# Patient Record
Sex: FEMALE | Race: WHITE | Employment: OTHER | URBAN - NONMETROPOLITAN AREA
[De-identification: names, ages, dates, MRNs, and addresses within clinical notes are randomized per-mention and may not be internally consistent; named-entity substitution may affect disease eponyms.]

---

## 2017-06-07 ENCOUNTER — HOSPITAL ENCOUNTER (OUTPATIENT)
Dept: GENERAL RADIOLOGY | Age: 77
Discharge: HOME | End: 2017-06-07
Payer: MEDICARE

## 2017-06-07 DIAGNOSIS — M54.16 LUMBAR RADICULOPATHY: ICD-10-CM

## 2017-06-07 PROCEDURE — 72100 X-RAY EXAM L-S SPINE 2/3 VWS: CPT

## 2020-08-14 ENCOUNTER — APPOINTMENT (OUTPATIENT)
Dept: CT IMAGING | Facility: HOSPITAL | Age: 80
End: 2020-08-14

## 2020-08-14 ENCOUNTER — APPOINTMENT (OUTPATIENT)
Dept: GENERAL RADIOLOGY | Facility: HOSPITAL | Age: 80
End: 2020-08-14

## 2020-08-14 ENCOUNTER — HOSPITAL ENCOUNTER (INPATIENT)
Facility: HOSPITAL | Age: 80
LOS: 3 days | Discharge: NURSING, REHAB OR LONG-TERM CARE | End: 2020-08-17
Attending: EMERGENCY MEDICINE | Admitting: INTERNAL MEDICINE

## 2020-08-14 DIAGNOSIS — R93.0 ABNORMAL CT OF THE HEAD: ICD-10-CM

## 2020-08-14 DIAGNOSIS — R41.89 IMPAIRED COGNITION: ICD-10-CM

## 2020-08-14 DIAGNOSIS — D64.9 ANEMIA, UNSPECIFIED TYPE: ICD-10-CM

## 2020-08-14 DIAGNOSIS — Z78.9 DECREASED ACTIVITIES OF DAILY LIVING (ADL): ICD-10-CM

## 2020-08-14 DIAGNOSIS — R27.0 ATAXIA: Primary | ICD-10-CM

## 2020-08-14 DIAGNOSIS — Z74.09 IMPAIRED MOBILITY: ICD-10-CM

## 2020-08-14 DIAGNOSIS — G89.4 CHRONIC PAIN SYNDROME: ICD-10-CM

## 2020-08-14 PROBLEM — G89.11 ACUTE PAIN DUE TO TRAUMA: Status: ACTIVE | Noted: 2020-08-14

## 2020-08-14 PROBLEM — R29.6 FALLS FREQUENTLY: Status: ACTIVE | Noted: 2020-08-14

## 2020-08-14 PROBLEM — R93.89 ABNORMAL CT SCAN: Status: ACTIVE | Noted: 2020-08-14

## 2020-08-14 PROBLEM — M19.91 PRIMARY OSTEOARTHRITIS: Status: ACTIVE | Noted: 2020-08-14

## 2020-08-14 PROBLEM — I10 ESSENTIAL HYPERTENSION: Status: ACTIVE | Noted: 2020-08-14

## 2020-08-14 LAB
ALBUMIN SERPL-MCNC: 3.9 G/DL (ref 3.5–5.2)
ALBUMIN/GLOB SERPL: 1.6 G/DL
ALP SERPL-CCNC: 77 U/L (ref 39–117)
ALT SERPL W P-5'-P-CCNC: 9 U/L (ref 1–33)
ANION GAP SERPL CALCULATED.3IONS-SCNC: 13 MMOL/L (ref 5–15)
AST SERPL-CCNC: 12 U/L (ref 1–32)
BACTERIA UR QL AUTO: ABNORMAL /HPF
BASOPHILS # BLD AUTO: 0.04 10*3/MM3 (ref 0–0.2)
BASOPHILS NFR BLD AUTO: 0.4 % (ref 0–1.5)
BILIRUB SERPL-MCNC: 0.2 MG/DL (ref 0–1.2)
BILIRUB UR QL STRIP: NEGATIVE
BUN SERPL-MCNC: 23 MG/DL (ref 8–23)
BUN/CREAT SERPL: 35.4 (ref 7–25)
CALCIUM SPEC-SCNC: 9.3 MG/DL (ref 8.6–10.5)
CHLORIDE SERPL-SCNC: 98 MMOL/L (ref 98–107)
CLARITY UR: CLEAR
CO2 SERPL-SCNC: 29 MMOL/L (ref 22–29)
COLOR UR: YELLOW
CREAT SERPL-MCNC: 0.65 MG/DL (ref 0.57–1)
DEPRECATED RDW RBC AUTO: 49.3 FL (ref 37–54)
EOSINOPHIL # BLD AUTO: 0.17 10*3/MM3 (ref 0–0.4)
EOSINOPHIL NFR BLD AUTO: 1.9 % (ref 0.3–6.2)
ERYTHROCYTE [DISTWIDTH] IN BLOOD BY AUTOMATED COUNT: 15.7 % (ref 12.3–15.4)
FERRITIN SERPL-MCNC: 746.8 NG/ML (ref 13–150)
GFR SERPL CREATININE-BSD FRML MDRD: 106 ML/MIN/1.73
GFR SERPL CREATININE-BSD FRML MDRD: 88 ML/MIN/1.73
GLOBULIN UR ELPH-MCNC: 2.4 GM/DL
GLUCOSE SERPL-MCNC: 135 MG/DL (ref 65–99)
GLUCOSE UR STRIP-MCNC: NEGATIVE MG/DL
HCT VFR BLD AUTO: 28.2 % (ref 34–46.6)
HGB BLD-MCNC: 8.8 G/DL (ref 12–15.9)
HGB UR QL STRIP.AUTO: ABNORMAL
HYALINE CASTS UR QL AUTO: ABNORMAL /LPF
IMM GRANULOCYTES # BLD AUTO: 0.05 10*3/MM3 (ref 0–0.05)
IMM GRANULOCYTES NFR BLD AUTO: 0.6 % (ref 0–0.5)
IRON 24H UR-MRATE: 26 MCG/DL (ref 37–145)
IRON SATN MFR SERPL: 8 % (ref 20–50)
KETONES UR QL STRIP: NEGATIVE
LEUKOCYTE ESTERASE UR QL STRIP.AUTO: NEGATIVE
LYMPHOCYTES # BLD AUTO: 0.99 10*3/MM3 (ref 0.7–3.1)
LYMPHOCYTES NFR BLD AUTO: 11 % (ref 19.6–45.3)
MCH RBC QN AUTO: 27.1 PG (ref 26.6–33)
MCHC RBC AUTO-ENTMCNC: 31.2 G/DL (ref 31.5–35.7)
MCV RBC AUTO: 86.8 FL (ref 79–97)
MONOCYTES # BLD AUTO: 0.66 10*3/MM3 (ref 0.1–0.9)
MONOCYTES NFR BLD AUTO: 7.3 % (ref 5–12)
NEUTROPHILS NFR BLD AUTO: 7.11 10*3/MM3 (ref 1.7–7)
NEUTROPHILS NFR BLD AUTO: 78.8 % (ref 42.7–76)
NITRITE UR QL STRIP: NEGATIVE
NRBC BLD AUTO-RTO: 0 /100 WBC (ref 0–0.2)
PH UR STRIP.AUTO: <=5 [PH] (ref 5–8)
PLATELET # BLD AUTO: 413 10*3/MM3 (ref 140–450)
PMV BLD AUTO: 9.4 FL (ref 6–12)
POTASSIUM SERPL-SCNC: 4.3 MMOL/L (ref 3.5–5.2)
PROT SERPL-MCNC: 6.3 G/DL (ref 6–8.5)
PROT UR QL STRIP: NEGATIVE
RBC # BLD AUTO: 3.25 10*6/MM3 (ref 3.77–5.28)
RBC # UR: ABNORMAL /HPF
REF LAB TEST METHOD: ABNORMAL
SARS-COV-2 RDRP RESP QL NAA+PROBE: NORMAL
SARS-COV-2 RNA PNL SPEC NAA+PROBE: NOT DETECTED
SODIUM SERPL-SCNC: 140 MMOL/L (ref 136–145)
SP GR UR STRIP: 1.02 (ref 1–1.03)
SQUAMOUS #/AREA URNS HPF: ABNORMAL /HPF
TIBC SERPL-MCNC: 340 MCG/DL (ref 298–536)
TRANSFERRIN SERPL-MCNC: 228 MG/DL (ref 200–360)
UROBILINOGEN UR QL STRIP: ABNORMAL
WBC # BLD AUTO: 9.02 10*3/MM3 (ref 3.4–10.8)
WBC UR QL AUTO: ABNORMAL /HPF

## 2020-08-14 PROCEDURE — 93010 ELECTROCARDIOGRAM REPORT: CPT | Performed by: INTERNAL MEDICINE

## 2020-08-14 PROCEDURE — 81001 URINALYSIS AUTO W/SCOPE: CPT | Performed by: EMERGENCY MEDICINE

## 2020-08-14 PROCEDURE — 87635 SARS-COV-2 COVID-19 AMP PRB: CPT | Performed by: NURSE PRACTITIONER

## 2020-08-14 PROCEDURE — 72125 CT NECK SPINE W/O DYE: CPT

## 2020-08-14 PROCEDURE — 73610 X-RAY EXAM OF ANKLE: CPT

## 2020-08-14 PROCEDURE — 83540 ASSAY OF IRON: CPT | Performed by: NURSE PRACTITIONER

## 2020-08-14 PROCEDURE — 70450 CT HEAD/BRAIN W/O DYE: CPT

## 2020-08-14 PROCEDURE — 93005 ELECTROCARDIOGRAM TRACING: CPT | Performed by: EMERGENCY MEDICINE

## 2020-08-14 PROCEDURE — 99285 EMERGENCY DEPT VISIT HI MDM: CPT

## 2020-08-14 PROCEDURE — 84466 ASSAY OF TRANSFERRIN: CPT | Performed by: NURSE PRACTITIONER

## 2020-08-14 PROCEDURE — 80053 COMPREHEN METABOLIC PANEL: CPT | Performed by: EMERGENCY MEDICINE

## 2020-08-14 PROCEDURE — 72131 CT LUMBAR SPINE W/O DYE: CPT

## 2020-08-14 PROCEDURE — 87635 SARS-COV-2 COVID-19 AMP PRB: CPT | Performed by: EMERGENCY MEDICINE

## 2020-08-14 PROCEDURE — 82728 ASSAY OF FERRITIN: CPT | Performed by: NURSE PRACTITIONER

## 2020-08-14 PROCEDURE — 85025 COMPLETE CBC W/AUTO DIFF WBC: CPT | Performed by: EMERGENCY MEDICINE

## 2020-08-14 PROCEDURE — 72128 CT CHEST SPINE W/O DYE: CPT

## 2020-08-14 RX ORDER — SODIUM CHLORIDE 9 MG/ML
50 INJECTION, SOLUTION INTRAVENOUS CONTINUOUS
Status: DISCONTINUED | OUTPATIENT
Start: 2020-08-14 | End: 2020-08-15

## 2020-08-14 RX ORDER — FOLIC ACID 1 MG/1
1 TABLET ORAL DAILY
COMMUNITY

## 2020-08-14 RX ORDER — LABETALOL 200 MG/1
100 TABLET, FILM COATED ORAL DAILY
Status: DISCONTINUED | OUTPATIENT
Start: 2020-08-15 | End: 2020-08-17 | Stop reason: HOSPADM

## 2020-08-14 RX ORDER — ACETAMINOPHEN 650 MG/1
650 SUPPOSITORY RECTAL EVERY 4 HOURS PRN
Status: DISCONTINUED | OUTPATIENT
Start: 2020-08-14 | End: 2020-08-17 | Stop reason: HOSPADM

## 2020-08-14 RX ORDER — HYDROCODONE BITARTRATE AND ACETAMINOPHEN 10; 325 MG/1; MG/1
1 TABLET ORAL 4 TIMES DAILY
Status: ON HOLD | COMMUNITY
End: 2020-08-17 | Stop reason: SDUPTHER

## 2020-08-14 RX ORDER — ONDANSETRON 4 MG/1
4 TABLET, FILM COATED ORAL EVERY 6 HOURS PRN
Status: DISCONTINUED | OUTPATIENT
Start: 2020-08-14 | End: 2020-08-17 | Stop reason: HOSPADM

## 2020-08-14 RX ORDER — AMOXICILLIN 250 MG
1 CAPSULE ORAL NIGHTLY
Status: DISCONTINUED | OUTPATIENT
Start: 2020-08-14 | End: 2020-08-17 | Stop reason: HOSPADM

## 2020-08-14 RX ORDER — HYDROCODONE BITARTRATE AND ACETAMINOPHEN 5; 325 MG/1; MG/1
1 TABLET ORAL ONCE
Status: COMPLETED | OUTPATIENT
Start: 2020-08-14 | End: 2020-08-14

## 2020-08-14 RX ORDER — KETOROLAC TROMETHAMINE 15 MG/ML
15 INJECTION, SOLUTION INTRAMUSCULAR; INTRAVENOUS EVERY 6 HOURS PRN
Status: DISCONTINUED | OUTPATIENT
Start: 2020-08-14 | End: 2020-08-17 | Stop reason: HOSPADM

## 2020-08-14 RX ORDER — LEVOTHYROXINE SODIUM 88 UG/1
88 TABLET ORAL
Status: DISCONTINUED | OUTPATIENT
Start: 2020-08-15 | End: 2020-08-17 | Stop reason: HOSPADM

## 2020-08-14 RX ORDER — SODIUM CHLORIDE 0.9 % (FLUSH) 0.9 %
10 SYRINGE (ML) INJECTION AS NEEDED
Status: DISCONTINUED | OUTPATIENT
Start: 2020-08-14 | End: 2020-08-17 | Stop reason: HOSPADM

## 2020-08-14 RX ORDER — ACETAMINOPHEN 160 MG/5ML
650 SOLUTION ORAL EVERY 4 HOURS PRN
Status: DISCONTINUED | OUTPATIENT
Start: 2020-08-14 | End: 2020-08-17 | Stop reason: HOSPADM

## 2020-08-14 RX ORDER — DULOXETIN HYDROCHLORIDE 30 MG/1
60 CAPSULE, DELAYED RELEASE ORAL DAILY
Status: DISCONTINUED | OUTPATIENT
Start: 2020-08-15 | End: 2020-08-17 | Stop reason: HOSPADM

## 2020-08-14 RX ORDER — ONDANSETRON 2 MG/ML
4 INJECTION INTRAMUSCULAR; INTRAVENOUS EVERY 6 HOURS PRN
Status: DISCONTINUED | OUTPATIENT
Start: 2020-08-14 | End: 2020-08-17 | Stop reason: HOSPADM

## 2020-08-14 RX ORDER — SODIUM CHLORIDE 0.9 % (FLUSH) 0.9 %
10 SYRINGE (ML) INJECTION EVERY 12 HOURS SCHEDULED
Status: DISCONTINUED | OUTPATIENT
Start: 2020-08-14 | End: 2020-08-17 | Stop reason: HOSPADM

## 2020-08-14 RX ORDER — LEVOTHYROXINE SODIUM 88 UG/1
88 TABLET ORAL DAILY
COMMUNITY

## 2020-08-14 RX ORDER — LABETALOL 100 MG/1
100 TABLET, FILM COATED ORAL 2 TIMES DAILY
COMMUNITY
End: 2020-08-17 | Stop reason: HOSPADM

## 2020-08-14 RX ORDER — LOSARTAN POTASSIUM 50 MG/1
50 TABLET ORAL DAILY
COMMUNITY

## 2020-08-14 RX ORDER — DULOXETIN HYDROCHLORIDE 60 MG/1
60 CAPSULE, DELAYED RELEASE ORAL DAILY
COMMUNITY

## 2020-08-14 RX ORDER — HYDROCODONE BITARTRATE AND ACETAMINOPHEN 10; 325 MG/1; MG/1
1 TABLET ORAL 4 TIMES DAILY PRN
Status: DISCONTINUED | OUTPATIENT
Start: 2020-08-14 | End: 2020-08-17 | Stop reason: HOSPADM

## 2020-08-14 RX ORDER — FOLIC ACID 1 MG/1
1 TABLET ORAL DAILY
Status: DISCONTINUED | OUTPATIENT
Start: 2020-08-15 | End: 2020-08-17 | Stop reason: HOSPADM

## 2020-08-14 RX ORDER — ACETAMINOPHEN 325 MG/1
650 TABLET ORAL EVERY 4 HOURS PRN
Status: DISCONTINUED | OUTPATIENT
Start: 2020-08-14 | End: 2020-08-17 | Stop reason: HOSPADM

## 2020-08-14 RX ORDER — HYDROCHLOROTHIAZIDE 12.5 MG/1
12.5 TABLET ORAL DAILY
COMMUNITY

## 2020-08-14 RX ADMIN — DOCUSATE SODIUM 50 MG AND SENNOSIDES 8.6 MG 1 TABLET: 8.6; 5 TABLET, FILM COATED ORAL at 21:48

## 2020-08-14 RX ADMIN — HYDROCODONE BITARTRATE AND ACETAMINOPHEN 1 TABLET: 5; 325 TABLET ORAL at 15:42

## 2020-08-14 RX ADMIN — SODIUM CHLORIDE 50 ML/HR: 9 INJECTION, SOLUTION INTRAVENOUS at 21:48

## 2020-08-14 RX ADMIN — SODIUM CHLORIDE, PRESERVATIVE FREE 10 ML: 5 INJECTION INTRAVENOUS at 21:48

## 2020-08-14 NOTE — ED PROVIDER NOTES
Subjective   Patient is an 80-year-old female who presents to the ER with generalized pain.  Patient states she has severe arthritis and has been in horrible pain for the last 3 months.  Patient states the pain is so bad that she has experienced multiple falls over the last several weeks.  Patient states she has hit her head but denies any loss of consciousness from the falls.  Her last fall occurred last evening.  Patient does complain of neck and back pain.  She has not taken any pain medications at home.  Patient denies any generalized weakness but does state she was recently treated for UTI.  Patient states she recently finished her antibiotics but does not remember the name of the antibiotic.  She denies any fever, chest pain, shortness of air, abdominal pain, nausea vomiting diarrhea, urinary changes, focal neurologic changes.          Review of Systems   Constitutional: Negative.    HENT: Negative.    Eyes: Negative.    Respiratory: Negative.    Cardiovascular: Negative.    Gastrointestinal: Negative.    Endocrine: Negative.    Genitourinary: Negative.    Musculoskeletal: Positive for arthralgias, back pain, myalgias and neck pain.   Skin: Negative.    Allergic/Immunologic: Negative.    Neurological: Negative.    Hematological: Negative.    Psychiatric/Behavioral: Negative.    All other systems reviewed and are negative.      Past Medical History:   Diagnosis Date   • Arthritis    • Hypertension        No Known Allergies    Past Surgical History:   Procedure Laterality Date   • BREAST SURGERY     • HIP BIPOLAR REPLACEMENT      bilateral       History reviewed. No pertinent family history.    Social History     Socioeconomic History   • Marital status:      Spouse name: Not on file   • Number of children: Not on file   • Years of education: Not on file   • Highest education level: Not on file   Tobacco Use   • Smoking status: Never Smoker   Substance and Sexual Activity   • Alcohol use: Never      Frequency: Never   • Drug use: Never           Objective   Physical Exam   Constitutional: She is oriented to person, place, and time. She appears well-developed and well-nourished.   HENT:   Head: Normocephalic and atraumatic.   Eyes: Pupils are equal, round, and reactive to light. Conjunctivae are normal.   Neck: Normal range of motion.   Cardiovascular: Normal rate, regular rhythm and normal heart sounds.   Pulmonary/Chest: Effort normal and breath sounds normal.   Abdominal: Soft. There is no tenderness.   Musculoskeletal: Normal range of motion. She exhibits no edema or deformity.   Tender to palpation C-spine and upper T-spine, nontender to palpation elsewhere including L-spine and extremities, normal range of motion, neurovascularly intact   Neurological: She is alert and oriented to person, place, and time. She has normal strength. No cranial nerve deficit or sensory deficit.   ataxia   Skin: Skin is warm.   Psychiatric: She has a normal mood and affect. Her behavior is normal.   Nursing note and vitals reviewed.      Procedures           ED Course      ECG: Normal sinus rhythm with a rate of 97, no acute ischemia or infarction    Patient was given Lortab.     Lab Results (last 24 hours)     Procedure Component Value Units Date/Time    CBC & Differential [[id number]] Collected:  08/14/20 1549    Specimen:  Blood Updated:  08/14/20 0495    Narrative:       The following orders were created for panel order CBC & Differential.  Procedure                               Abnormality         Status                     ---------                               -----------         ------                     CBC Auto Differential[[id number]]        Abnormal            Final result                 Please view results for these tests on the individual orders.    Comprehensive Metabolic Panel [[id number]]  (Abnormal) Collected:  08/14/20 1549    Specimen:  Blood Updated:  08/14/20 1615     Glucose 135 mg/dL      BUN 23 mg/dL       Creatinine 0.65 mg/dL      Sodium 140 mmol/L      Potassium 4.3 mmol/L      Chloride 98 mmol/L      CO2 29.0 mmol/L      Calcium 9.3 mg/dL      Total Protein 6.3 g/dL      Albumin 3.90 g/dL      ALT (SGPT) 9 U/L      AST (SGOT) 12 U/L      Alkaline Phosphatase 77 U/L      Total Bilirubin 0.2 mg/dL      eGFR Non African Amer 88 mL/min/1.73      eGFR  African Amer 106 mL/min/1.73      Globulin 2.4 gm/dL      A/G Ratio 1.6 g/dL      BUN/Creatinine Ratio 35.4     Anion Gap 13.0 mmol/L     Narrative:       GFR Normal >60  Chronic Kidney Disease <60  Kidney Failure <15      CBC Auto Differential [[id number]]  (Abnormal) Collected:  08/14/20 1549    Specimen:  Blood Updated:  08/14/20 1559     WBC 9.02 10*3/mm3      RBC 3.25 10*6/mm3      Hemoglobin 8.8 g/dL      Hematocrit 28.2 %      MCV 86.8 fL      MCH 27.1 pg      MCHC 31.2 g/dL      RDW 15.7 %      RDW-SD 49.3 fl      MPV 9.4 fL      Platelets 413 10*3/mm3      Neutrophil % 78.8 %      Lymphocyte % 11.0 %      Monocyte % 7.3 %      Eosinophil % 1.9 %      Basophil % 0.4 %      Immature Grans % 0.6 %      Neutrophils, Absolute 7.11 10*3/mm3      Lymphocytes, Absolute 0.99 10*3/mm3      Monocytes, Absolute 0.66 10*3/mm3      Eosinophils, Absolute 0.17 10*3/mm3      Basophils, Absolute 0.04 10*3/mm3      Immature Grans, Absolute 0.05 10*3/mm3      nRBC 0.0 /100 WBC     Urinalysis With Culture If Indicated - Urine, Clean Catch [[id number]]  (Abnormal) Collected:  08/14/20 1648    Specimen:  Urine, Clean Catch Updated:  08/14/20 1659     Color, UA Yellow     Appearance, UA Clear     pH, UA <=5.0     Specific Gravity, UA 1.020     Glucose, UA Negative     Ketones, UA Negative     Bilirubin, UA Negative     Blood, UA Trace     Protein, UA Negative     Leuk Esterase, UA Negative     Nitrite, UA Negative     Urobilinogen, UA 1.0 E.U./dL    Urinalysis, Microscopic Only - Urine, Clean Catch [[id number]]  (Abnormal) Collected:  08/14/20 9731    Specimen:  Urine, Clean Catch  Updated:  08/14/20 1700     RBC, UA 6-12 /HPF      WBC, UA 0-2 /HPF      Bacteria, UA None Seen /HPF      Squamous Epithelial Cells, UA 0-2 /HPF      Hyaline Casts, UA 0-2 /LPF      Methodology Automated Microscopy        CT Head Without Contrast   Final Result   Impression:       1.  No acute intracranial findings.   2.  Global cerebral volume loss and presumed chronic microvascular   ischemic white matter change. The degree of ventricular dilatation is   questionably disproportionate to the background of underlying volume   loss raising the possibility of normal pressure hydrocephalus. Please   clinically correlate.   This report was finalized on 08/14/2020 16:37 by Dr. Blane Pacheco MD.      CT Cervical Spine Without Contrast   Final Result   Impression:   1.  No acute fracture.   2.  3 mm anterolisthesis of C3 on C4 and C4 on C5, likely related to   degenerative change.   3.  Reversal of normal cervical lordosis.   4.  Severe multilevel cervical spine degenerative change with multilevel   neural foraminal stenosis.       This report was finalized on 08/14/2020 16:48 by Dr. Blane Pacheco MD.      CT Thoracic Spine Without Contrast   Final Result   Impression:       No acute fracture or subluxation. Mild dextrocurvature and moderate   multilevel degenerative change of the thoracic spine.   This report was finalized on 08/14/2020 17:10 by Dr. Blane Pacheco MD.      CT Lumbar Spine Without Contrast   Final Result   Impression:    1. Degenerative spondylosis is noted with degenerative disc disease at   all levels. Bilateral pars defects at L5 with 10 mm anterolisthesis of   L5 on S1.           This report was finalized on 08/14/2020 17:01 by Dr. Jacob Pickard MD.        Labs showed anemia and hematuria.  CT scan of the CT and L-spine showed no acute fractures.  Did show anterior listhesis and spondylosis.  CT scan of the head showed global cerebral volume loss that is most likely chronic changes but cannot rule  out normal pressure hydrocephalus.  Patient was unable to ambulate in the ER.  Patient was admitted to the hospitalist service by Dr. Patten for further treatment.                                MDM    Final diagnoses:   Ataxia   Abnormal CT of the head   Anemia, unspecified type            Patricia Fermin MD  08/14/20 2331

## 2020-08-14 NOTE — H&P
"    [facility] Medicine Services  HISTORY AND PHYSICAL    Date of Admission: 8/14/2020  Primary Care Physician: Hardik Taveras DO    Subjective     Chief Complaint: Severe pain neck, back and right ankle from fall last evening    History of Present Illness  Betty Dennis is an 80-year-old female with a past medical history of osteoarthritis taking methotrexate-3 doses per week, pseudogout, hypertension, hypothyroidism, chronic pain syndrome secondary to OA.  Patient lives alone.  She presented to [facility] today after fall last evening now with acute on chronic neck, back and right ankle pain.  Patient states she has had arthritis pain for years however it has worsened over the past 3 months.  She has had multiple falls over the past few weeks.  She did have a fall last evening stating she hit her head however she did not lose consciousness.  CT evaluation reveals multilevel spinal degenerative disease, possible normal pressure hydrocephalus, awaiting x-ray results of the right ankle.  Laboratory studies revealed hematuria with a history of recent urinary tract infection for which she has been taking ciprofloxacin.  She does have 1 more day of treatment.  Also noted anemia with hemoglobin 8.8, normal MCV.  Currently patient is reporting pain on a scale of 1-10.  She does have multiple areas of pain neck, back and right ankle however she states she \"hurts all over.  She has generalized weakness, urinary incontinence and occasional constipation.  She denies shortness of breathing or chest pain.  Patient is admitted for further evaluation and treatment.    Review of Systems   A 10 point review of systems was completed, all negative except for those discussed in HPI    Past Medical History:   Past Medical History:   Diagnosis Date   • Chronic pain syndrome, secondary to OA    • Frequent falls    • Hypertension    • Hypothyroidism    • Osteoarthritis    • Pseudogout  " "      Past Surgical History:   Past Surgical History:   Procedure Laterality Date   • BREAST SURGERY     • HIP BIPOLAR REPLACEMENT      bilateral       Family History: family history includes Heart disease in her father; Hypertension in her mother.    Social History:  reports that she has never smoked. She does not have any smokeless tobacco history on file. She reports that she does not drink alcohol or use drugs.    Code Status: Full, if unable speak for herself her power of  are her nieces Constanza Bach or Elis Espana      Allergies:  No Known Allergies    Medications:  No current facility-administered medications on file prior to encounter.      Medication Sig   • DULoxetine (CYMBALTA) 60 MG capsule Take 60 mg by mouth Daily.   • folic acid (FOLVITE) 1 MG tablet Take 1 mg by mouth Daily.   • hydroCHLOROthiazide (HYDRODIURIL) 12.5 MG tablet Take 12.5 mg by mouth Daily.   • HYDROcodone-acetaminophen (NORCO)  MG per tablet Take 1 tablet by mouth 4 (Four) Times a Day.   • labetalol (NORMODYNE) 100 MG tablet Take 100 mg by mouth Daily.   • levothyroxine (SYNTHROID, LEVOTHROID) 88 MCG tablet Take 88 mcg by mouth Daily.   • LOSARTAN POTASSIUM PO Take 50 mg by mouth Daily.   • methotrexate 2.5 MG tablet Take  by mouth 3 (Three) Doses Each Week. Take Doses 12 (Twelve) Hours Apart. Patient unsure of dosage   • NON FORMULARY Take 1 tablet by mouth Daily. Unknown blood pressure pill         Objective     /59 (BP Location: Right arm, Patient Position: Sitting)   Pulse 76   Temp 98.2 °F (36.8 °C) (Oral)   Resp 16   Ht 152.4 cm (60\")   Wt 56.7 kg (125 lb)   SpO2 96%   BMI 24.41 kg/m²   Physical Exam   Constitutional: She is oriented to person, place, and time. She appears well-developed and well-nourished. No distress.   HENT:   Head: Normocephalic and atraumatic.   Eyes: Pupils are equal, round, and reactive to light. Conjunctivae and EOM are normal. No scleral icterus.   Neck: Normal range of motion. " Neck supple. No JVD present. No tracheal deviation present.   Cardiovascular: Normal rate, regular rhythm, normal heart sounds and intact distal pulses. Exam reveals no gallop.   No murmur heard.  Pulmonary/Chest: Effort normal and breath sounds normal. No respiratory distress. She has no wheezes. She has no rales.   Abdominal: Soft. Bowel sounds are normal. She exhibits no distension. There is no tenderness. There is no guarding.   Musculoskeletal: Normal range of motion. She exhibits edema.   Generalized weakness and debility, bilateral ankle edema worse on right   Neurological: She is alert and oriented to person, place, and time.   Skin: Skin is warm and dry. No rash noted. She is not diaphoretic. There is erythema (bilateral lower extremity warmth and erythema). No pallor.   Psychiatric: She has a normal mood and affect. Her behavior is normal.   Vitals reviewed.        Pertinent Data:   Lab Results (last 72 hours)     Procedure Component Value Units Date/Time    COVID PRE-OP / PRE-PROCEDURE SCREENING ORDER (NO ISOLATION) - Swab, Nasopharynx [[id number]] Collected:  08/14/20 1810    Specimen:  Swab from Nasopharynx Updated:  08/14/20 1830    Narrative:       The following orders were created for panel order COVID PRE-OP / PRE-PROCEDURE SCREENING ORDER (NO ISOLATION) - Swab, Nasopharynx.  Procedure                               Abnormality         Status                     ---------                               -----------         ------                     COVID-19 ABBOTT IN-HOUS...[[id number]]                      In process                   Please view results for these tests on the individual orders.    COVID-19, ABBOTT IN-HOUSE,NP Swab (NO TRANSPORT MEDIA) 2 HR TAT - Swab, Nasopharynx [[id number]] Collected:  08/14/20 1810    Specimen:  Swab from Nasopharynx Updated:  08/14/20 1830    Urinalysis, Microscopic Only - Urine, Clean Catch [[id number]]  (Abnormal) Collected:  08/14/20 1648    Specimen:  Urine,  Clean Catch Updated:  08/14/20 1700     RBC, UA 6-12 /HPF      WBC, UA 0-2 /HPF      Bacteria, UA None Seen /HPF      Squamous Epithelial Cells, UA 0-2 /HPF      Hyaline Casts, UA 0-2 /LPF      Methodology Automated Microscopy    Urinalysis With Culture If Indicated - Urine, Clean Catch [[id number]]  (Abnormal) Collected:  08/14/20 1648    Specimen:  Urine, Clean Catch Updated:  08/14/20 1659     Color, UA Yellow     Appearance, UA Clear     pH, UA <=5.0     Specific Gravity, UA 1.020     Glucose, UA Negative     Ketones, UA Negative     Bilirubin, UA Negative     Blood, UA Trace     Protein, UA Negative     Leuk Esterase, UA Negative     Nitrite, UA Negative     Urobilinogen, UA 1.0 E.U./dL    Comprehensive Metabolic Panel [[id number]]  (Abnormal) Collected:  08/14/20 1549    Specimen:  Blood Updated:  08/14/20 1615     Glucose 135 mg/dL      BUN 23 mg/dL      Creatinine 0.65 mg/dL      Sodium 140 mmol/L      Potassium 4.3 mmol/L      Chloride 98 mmol/L      CO2 29.0 mmol/L      Calcium 9.3 mg/dL      Total Protein 6.3 g/dL      Albumin 3.90 g/dL      ALT (SGPT) 9 U/L      AST (SGOT) 12 U/L      Alkaline Phosphatase 77 U/L      Total Bilirubin 0.2 mg/dL      eGFR Non African Amer 88 mL/min/1.73      eGFR  African Amer 106 mL/min/1.73      Globulin 2.4 gm/dL      A/G Ratio 1.6 g/dL      BUN/Creatinine Ratio 35.4     Anion Gap 13.0 mmol/L     CBC Auto Differential [[id number]]  (Abnormal) Collected:  08/14/20 1549    Specimen:  Blood Updated:  08/14/20 1559     WBC 9.02 10*3/mm3      RBC 3.25 10*6/mm3      Hemoglobin 8.8 g/dL      Hematocrit 28.2 %      MCV 86.8 fL      MCH 27.1 pg      MCHC 31.2 g/dL      RDW 15.7 %      RDW-SD 49.3 fl      MPV 9.4 fL      Platelets 413 10*3/mm3      Neutrophil % 78.8 %      Lymphocyte % 11.0 %      Monocyte % 7.3 %      Eosinophil % 1.9 %      Basophil % 0.4 %      Immature Grans % 0.6 %      Neutrophils, Absolute 7.11 10*3/mm3      Lymphocytes, Absolute 0.99 10*3/mm3      Monocytes,  Absolute 0.66 10*3/mm3      Eosinophils, Absolute 0.17 10*3/mm3      Basophils, Absolute 0.04 10*3/mm3      Immature Grans, Absolute 0.05 10*3/mm3      nRBC 0.0 /100 WBC         Imaging Results (Last 24 Hours)     Procedure Component Value Units Date/Time    XR Ankle 3+ View Right [[id number]] Resulted:  08/14/20 1815     Updated:  08/14/20 1831    CT Thoracic Spine Without Contrast [[id number]] Collected:  08/14/20 1705     Updated:  08/14/20 1713    Narrative:       Exam: CT THORACIC SPINE WO CONTRAST-     Indication: T/L-spine trauma, minor-mod, low back pain     Comparison: None     Dose length product: 641 mGy cm. Automated exposure control was also  utilized to decrease patient radiation dose.     Findings:     Vertebral body heights are maintained. No acute fracture or subluxation.  Mild dextrocurvature the lumbar spine is noted. Moderate multilevel  degenerative change characterized by endplate osteophytes and facet  arthropathy. Included lungs are clear. No visible pneumothorax.       Impression:       Impression:     No acute fracture or subluxation. Mild dextrocurvature and moderate  multilevel degenerative change of the thoracic spine.  This report was finalized on 08/14/2020 17:10 by Dr. Blane Pacheco MD.    CT Lumbar Spine Without Contrast [[id number]] Collected:  08/14/20 1657     Updated:  08/14/20 1704    Narrative:       EXAMINATION:   CT LUMBAR SPINE WO CONTRAST-  8/14/2020 4:57 PM CDT     HISTORY: CT lumbar spine without contrast 8/14/2020 3:57 PM CDT     History: T/L-spine trauma, minor-mod, low back pain     Comparison: None      DLP: 544 mGy cm     Technique: Serial helical tomographic images of the lumbar spine were  obtained without the use of intravenous contrast. Additionally, sagittal  and coronal reformatted images were provided for review.      Findings:   Bilateral pars defects are present at L5. There is a 10 mm  anterolisthesis of L5 on S1. The remainder of the lumbar vertebra  are  relatively aligned.. Vertebral body heights are well maintained. Vacuum  phenomenon is present at all disc spaces.. There is no bony narrowing of  the spinal canal or neuroforamina. Vascular calcifications present  abdominal aorta..      SI joints are unremarkable.        Impression:       Impression:   1. Degenerative spondylosis is noted with degenerative disc disease at  all levels. Bilateral pars defects at L5 with 10 mm anterolisthesis of  L5 on S1.        This report was finalized on 08/14/2020 17:01 by Dr. Jacob Pickard MD.    CT Cervical Spine Without Contrast [[id number]] Collected:  08/14/20 1638     Updated:  08/14/20 1651    Narrative:       Exam: CT CERVICAL SPINE WO CONTRAST-     Indication: C-spine trauma, NEXUS/CCR positive, +risk factor(s)     Comparison: None     Dose length product: 290 mGy cm. Automated exposure control was also  utilized to decrease patient radiation dose.     Findings:     Skull base relationships are maintained. Odontoid process is intact.  There is reversal of normal cervical lordosis. 3 mm anterolisthesis of  C3 on C4 and C4 on C5. Vertebral body heights are maintained. No acute  fracture. Severe multilevel degenerative change with varying degrees of  moderate to severe multilevel neural foraminal stenosis. No acute soft  tissue finding. No apical pneumothorax.       Impression:       Impression:  1.  No acute fracture.  2.  3 mm anterolisthesis of C3 on C4 and C4 on C5, likely related to  degenerative change.  3.  Reversal of normal cervical lordosis.  4.  Severe multilevel cervical spine degenerative change with multilevel  neural foraminal stenosis.     This report was finalized on 08/14/2020 16:48 by Dr. Blane Pacheco MD.    CT Head Without Contrast [[id number]] Collected:  08/14/20 1633     Updated:  08/14/20 1640    Narrative:       Exam: CT HEAD WO CONTRAST-     Indication: Head trauma, headache     Comparison: None     Dose length product: 758 mGy cm. Automated  exposure control was also  utilized to decrease patient radiation dose.     Findings:     No acute intracranial hemorrhage. No loss of gray-white differentiation.  Chronic microvascular ischemic white matter change and global cerebral  volume loss is noted. Ex vacuo ventricular dilatation. Basilar cisterns  are patent. No midline shift or mass effect. Visualized portion of the  orbits appear unremarkable. The mastoid air cells and visualized  paranasal sinuses are clear. No acute osseous finding.       Impression:       Impression:     1.  No acute intracranial findings.  2.  Global cerebral volume loss and presumed chronic microvascular  ischemic white matter change. The degree of ventricular dilatation is  questionably disproportionate to the background of underlying volume  loss raising the possibility of normal pressure hydrocephalus. Please  clinically correlate.  This report was finalized on 08/14/2020 16:37 by Dr. Blane Pacheco MD.          I have personally reviewed and interpreted the radiology studies and ECG obtained at time of admission.     Assessment / Plan     Assessment:   Active Hospital Problems    Diagnosis   • Ataxia   • Falls frequently   • Primary osteoarthritis   • Abnormal CT scan   • Essential hypertension   • Normocytic anemia   • Acute pain due to trauma, fall   • Chronic pain syndrome, osteoarthritis       Plan:   1.  Admit as inpatient  2.  MRI of the brain without contrast in a.m.  3.  Consult PT/OT and  in a.m.  4.  DVT prophylaxis with SCDs  5.  Home medications reviewed and restarted as appropriate, holding diuretics  6.  Add Toradol 15 mg IV every 6 hours as needed for pain-continue home [facility]  7.  Labs in a.m.  8.  Anemia substrates and stool for occult blood  9.  Gentle hydration with normal saline at 50 mL/hour\  10.  COVID study pending      I discussed the patient's findings and my recommendations with: Ventura Patten MD  Time spent 35 minutes    Patient seen and  "examined by me on 8/14/2020 at 5:47 PM.    Electronically signed by BETTY Sue, 08/14/20, 18:31.    I personally evaluated and examined the patient in conjunction with BETTY Meek and agree with the assessment, treatment plan, and disposition of the patient as recorded by her. My history, exam, and further recommendations are:     Subjective:   Patient seen and examined at bedside.  The patient overall is doing well.  The patient is notably uncoordinated on neurological exam.  Patient is very weak, and is indicating that her right ankle significantly hurts.  Patient had numerous radiographic studies performed, and there are no fractures.  Suspect that the patient is having worsening debility, and difficulty caring for herself.    Objective:  /61 (BP Location: Right arm, Patient Position: Lying)   Pulse 100   Temp 98 °F (36.7 °C) (Oral)   Resp 16   Ht 152.4 cm (60\")   Wt 59.6 kg (131 lb 8 oz)   SpO2 97%   BMI 25.68 kg/m²   Gen: No acute distress, resting comfortably  CV: Normal rhythm; murmur  Neuro: global weakness; tremor; discoordination    Assessment/Plan:  MRI  PRN pain medication  SCDs  Anemia substraits   Gentle IVF   Nutrition consult     Electronically signed by Sawyer Patten MD, 08/15/20, 15:08.    "

## 2020-08-15 ENCOUNTER — APPOINTMENT (OUTPATIENT)
Dept: MRI IMAGING | Facility: HOSPITAL | Age: 80
End: 2020-08-15

## 2020-08-15 LAB
ALBUMIN SERPL-MCNC: 3.8 G/DL (ref 3.5–5.2)
ALBUMIN/GLOB SERPL: 1.8 G/DL
ALP SERPL-CCNC: 71 U/L (ref 39–117)
ALT SERPL W P-5'-P-CCNC: 11 U/L (ref 1–33)
ANION GAP SERPL CALCULATED.3IONS-SCNC: 11 MMOL/L (ref 5–15)
AST SERPL-CCNC: 25 U/L (ref 1–32)
BASOPHILS # BLD AUTO: 0.03 10*3/MM3 (ref 0–0.2)
BASOPHILS NFR BLD AUTO: 0.3 % (ref 0–1.5)
BILIRUB SERPL-MCNC: 0.2 MG/DL (ref 0–1.2)
BUN SERPL-MCNC: 21 MG/DL (ref 8–23)
BUN/CREAT SERPL: 36.2 (ref 7–25)
CALCIUM SPEC-SCNC: 9.4 MG/DL (ref 8.6–10.5)
CHLORIDE SERPL-SCNC: 102 MMOL/L (ref 98–107)
CHOLEST SERPL-MCNC: 163 MG/DL (ref 0–200)
CO2 SERPL-SCNC: 28 MMOL/L (ref 22–29)
CREAT SERPL-MCNC: 0.58 MG/DL (ref 0.57–1)
DEPRECATED RDW RBC AUTO: 49.2 FL (ref 37–54)
EOSINOPHIL # BLD AUTO: 0.17 10*3/MM3 (ref 0–0.4)
EOSINOPHIL NFR BLD AUTO: 1.7 % (ref 0.3–6.2)
ERYTHROCYTE [DISTWIDTH] IN BLOOD BY AUTOMATED COUNT: 15.5 % (ref 12.3–15.4)
GFR SERPL CREATININE-BSD FRML MDRD: 100 ML/MIN/1.73
GLOBULIN UR ELPH-MCNC: 2.1 GM/DL
GLUCOSE SERPL-MCNC: 111 MG/DL (ref 65–99)
HBA1C MFR BLD: 5.9 % (ref 4.8–5.6)
HCT VFR BLD AUTO: 26.4 % (ref 34–46.6)
HDLC SERPL-MCNC: 46 MG/DL (ref 40–60)
HGB BLD-MCNC: 8.3 G/DL (ref 12–15.9)
IMM GRANULOCYTES # BLD AUTO: 0.08 10*3/MM3 (ref 0–0.05)
IMM GRANULOCYTES NFR BLD AUTO: 0.8 % (ref 0–0.5)
LDLC SERPL CALC-MCNC: 100 MG/DL (ref 0–100)
LDLC/HDLC SERPL: 2.18 {RATIO}
LYMPHOCYTES # BLD AUTO: 1.19 10*3/MM3 (ref 0.7–3.1)
LYMPHOCYTES NFR BLD AUTO: 11.9 % (ref 19.6–45.3)
MCH RBC QN AUTO: 27.4 PG (ref 26.6–33)
MCHC RBC AUTO-ENTMCNC: 31.4 G/DL (ref 31.5–35.7)
MCV RBC AUTO: 87.1 FL (ref 79–97)
MONOCYTES # BLD AUTO: 0.69 10*3/MM3 (ref 0.1–0.9)
MONOCYTES NFR BLD AUTO: 6.9 % (ref 5–12)
NEUTROPHILS NFR BLD AUTO: 7.83 10*3/MM3 (ref 1.7–7)
NEUTROPHILS NFR BLD AUTO: 78.4 % (ref 42.7–76)
NRBC BLD AUTO-RTO: 0 /100 WBC (ref 0–0.2)
PLATELET # BLD AUTO: 384 10*3/MM3 (ref 140–450)
PMV BLD AUTO: 9.4 FL (ref 6–12)
POTASSIUM SERPL-SCNC: 3.9 MMOL/L (ref 3.5–5.2)
PROT SERPL-MCNC: 5.9 G/DL (ref 6–8.5)
RBC # BLD AUTO: 3.03 10*6/MM3 (ref 3.77–5.28)
SODIUM SERPL-SCNC: 141 MMOL/L (ref 136–145)
TRIGL SERPL-MCNC: 84 MG/DL (ref 0–150)
VIT B12 BLD-MCNC: 842 PG/ML (ref 211–946)
VLDLC SERPL-MCNC: 16.8 MG/DL
WBC # BLD AUTO: 9.99 10*3/MM3 (ref 3.4–10.8)

## 2020-08-15 PROCEDURE — 70551 MRI BRAIN STEM W/O DYE: CPT

## 2020-08-15 PROCEDURE — 25010000002 ENOXAPARIN PER 10 MG: Performed by: INTERNAL MEDICINE

## 2020-08-15 PROCEDURE — 97162 PT EVAL MOD COMPLEX 30 MIN: CPT

## 2020-08-15 PROCEDURE — 83036 HEMOGLOBIN GLYCOSYLATED A1C: CPT | Performed by: NURSE PRACTITIONER

## 2020-08-15 PROCEDURE — 85025 COMPLETE CBC W/AUTO DIFF WBC: CPT | Performed by: NURSE PRACTITIONER

## 2020-08-15 PROCEDURE — 97165 OT EVAL LOW COMPLEX 30 MIN: CPT | Performed by: OCCUPATIONAL THERAPIST

## 2020-08-15 PROCEDURE — 92523 SPEECH SOUND LANG COMPREHEN: CPT | Performed by: SPEECH-LANGUAGE PATHOLOGIST

## 2020-08-15 PROCEDURE — 80053 COMPREHEN METABOLIC PANEL: CPT | Performed by: NURSE PRACTITIONER

## 2020-08-15 PROCEDURE — 82607 VITAMIN B-12: CPT | Performed by: NURSE PRACTITIONER

## 2020-08-15 PROCEDURE — 80061 LIPID PANEL: CPT | Performed by: NURSE PRACTITIONER

## 2020-08-15 RX ADMIN — HYDROCODONE BITARTRATE AND ACETAMINOPHEN 1 TABLET: 10; 325 TABLET ORAL at 10:13

## 2020-08-15 RX ADMIN — DULOXETINE HYDROCHLORIDE 60 MG: 30 CAPSULE, DELAYED RELEASE ORAL at 08:43

## 2020-08-15 RX ADMIN — FOLIC ACID 1 MG: 1 TABLET ORAL at 08:43

## 2020-08-15 RX ADMIN — LABETALOL HYDROCHLORIDE 100 MG: 200 TABLET, FILM COATED ORAL at 08:44

## 2020-08-15 RX ADMIN — SODIUM CHLORIDE, PRESERVATIVE FREE 10 ML: 5 INJECTION INTRAVENOUS at 21:02

## 2020-08-15 RX ADMIN — HYDROCODONE BITARTRATE AND ACETAMINOPHEN 1 TABLET: 10; 325 TABLET ORAL at 21:20

## 2020-08-15 RX ADMIN — ENOXAPARIN SODIUM 30 MG: 30 INJECTION SUBCUTANEOUS at 17:33

## 2020-08-15 RX ADMIN — HYDROCODONE BITARTRATE AND ACETAMINOPHEN 1 TABLET: 10; 325 TABLET ORAL at 17:32

## 2020-08-15 RX ADMIN — LEVOTHYROXINE SODIUM 88 MCG: 88 TABLET ORAL at 06:02

## 2020-08-15 NOTE — DISCHARGE PLACEMENT REQUEST
"Simpson [phone]    Laila Dos Santos (80 y.o. Female)     Date of Birth Social Security Number Address Home Phone MRN    1940  [address]  [place] [address] [phone] [id number]    Mandaen Marital Status          Other        Admission Date Admission Type Admitting Provider Attending Provider Department, Room/Bed    8/14/20 Emergency Sawyer Patten MD Fleming, John Eric, MD [facility] 3A, 344/1    Discharge Date Discharge Disposition Discharge Destination                       Attending Provider:  Sawyer Patten MD    Allergies:  No Known Allergies    Isolation:  None   Infection:  None   Code Status:  CPR    Ht:  152.4 cm (60\")   Wt:  59.6 kg (131 lb 8 oz)    Admission Cmt:  None   Principal Problem:  None                Active Insurance as of 8/14/2020     Primary Coverage     Payor Plan Insurance Group Employer/Plan Group    MEDICARE MEDICARE A & B      Payor Plan Address Payor Plan Phone Number Payor Plan Fax Number Effective Dates    [address] [phone]  7/1/2005 - None Entered    [facility] [id number]       Subscriber Name Subscriber Birth Date Member ID       LAILA DOS SANTOS 1940 [id number]                 Emergency Contacts      (Rel.) Home Phone Work Phone Mobile Phone    KAMCARA (Relative) [phone] -- [phone]               History & Physical      Georgina Green APRN at 08/14/20 1746              [facility]  HISTORY AND PHYSICAL    Date of Admission: 8/14/2020  Primary Care Physician: Hardik Taveras DO    Subjective     Chief Complaint: Severe pain neck, back and right ankle from fall last evening    History of Present Illness  Laila Dos Santos is an 80-year-old female with a past medical history of osteoarthritis taking methotrexate-3 doses per week, pseudogout, hypertension, hypothyroidism, chronic pain syndrome secondary to OA.  Patient lives alone.  She presented to " "[facility] today after fall last evening now with acute on chronic neck, back and right ankle pain.  Patient states she has had arthritis pain for years however it has worsened over the past 3 months.  She has had multiple falls over the past few weeks.  She did have a fall last evening stating she hit her head however she did not lose consciousness.  CT evaluation reveals multilevel spinal degenerative disease, possible normal pressure hydrocephalus, awaiting x-ray results of the right ankle.  Laboratory studies revealed hematuria with a history of recent urinary tract infection for which she has been taking ciprofloxacin.  She does have 1 more day of treatment.  Also noted anemia with hemoglobin 8.8, normal MCV.  Currently patient is reporting pain on a scale of 1-10.  She does have multiple areas of pain neck, back and right ankle however she states she \"hurts all over.  She has generalized weakness, urinary incontinence and occasional constipation.  She denies shortness of breathing or chest pain.  Patient is admitted for further evaluation and treatment.    Review of Systems   A 10 point review of systems was completed, all negative except for those discussed in HPI    Past Medical History:   Past Medical History:   Diagnosis Date   • Chronic pain syndrome, secondary to OA    • Frequent falls    • Hypertension    • Hypothyroidism    • Osteoarthritis    • Pseudogout        Past Surgical History:   Past Surgical History:   Procedure Laterality Date   • BREAST SURGERY     • HIP BIPOLAR REPLACEMENT      bilateral       Family History: family history includes Heart disease in her father; Hypertension in her mother.    Social History:  reports that she has never smoked. She does not have any smokeless tobacco history on file. She reports that she does not drink alcohol or use drugs.    Code Status: Full, if unable speak for herself her power of  are her nieces Constanza Bach or Elis " "Jovi      Allergies:  No Known Allergies    Medications:  No current facility-administered medications on file prior to encounter.      Medication Sig   • DULoxetine (CYMBALTA) 60 MG capsule Take 60 mg by mouth Daily.   • folic acid (FOLVITE) 1 MG tablet Take 1 mg by mouth Daily.   • hydroCHLOROthiazide (HYDRODIURIL) 12.5 MG tablet Take 12.5 mg by mouth Daily.   • HYDROcodone-acetaminophen (NORCO)  MG per tablet Take 1 tablet by mouth 4 (Four) Times a Day.   • labetalol (NORMODYNE) 100 MG tablet Take 100 mg by mouth Daily.   • levothyroxine (SYNTHROID, LEVOTHROID) 88 MCG tablet Take 88 mcg by mouth Daily.   • LOSARTAN POTASSIUM PO Take 50 mg by mouth Daily.   • methotrexate 2.5 MG tablet Take  by mouth 3 (Three) Doses Each Week. Take Doses 12 (Twelve) Hours Apart. Patient unsure of dosage   • NON FORMULARY Take 1 tablet by mouth Daily. Unknown blood pressure pill         Objective     /59 (BP Location: Right arm, Patient Position: Sitting)   Pulse 76   Temp 98.2 °F (36.8 °C) (Oral)   Resp 16   Ht 152.4 cm (60\")   Wt 56.7 kg (125 lb)   SpO2 96%   BMI 24.41 kg/m²    Physical Exam   Constitutional: She is oriented to person, place, and time. She appears well-developed and well-nourished. No distress.   HENT:   Head: Normocephalic and atraumatic.   Eyes: Pupils are equal, round, and reactive to light. Conjunctivae and EOM are normal. No scleral icterus.   Neck: Normal range of motion. Neck supple. No JVD present. No tracheal deviation present.   Cardiovascular: Normal rate, regular rhythm, normal heart sounds and intact distal pulses. Exam reveals no gallop.   No murmur heard.  Pulmonary/Chest: Effort normal and breath sounds normal. No respiratory distress. She has no wheezes. She has no rales.   Abdominal: Soft. Bowel sounds are normal. She exhibits no distension. There is no tenderness. There is no guarding.   Musculoskeletal: Normal range of motion. She exhibits edema.   Generalized weakness and " debility, bilateral ankle edema worse on right   Neurological: She is alert and oriented to person, place, and time.   Skin: Skin is warm and dry. No rash noted. She is not diaphoretic. There is erythema (bilateral lower extremity warmth and erythema). No pallor.   Psychiatric: She has a normal mood and affect. Her behavior is normal.   Vitals reviewed.        Pertinent Data:   Lab Results (last 72 hours)     Procedure Component Value Units Date/Time    COVID PRE-OP / PRE-PROCEDURE SCREENING ORDER (NO ISOLATION) - Swab, Nasopharynx [[id number]] Collected:  08/14/20 1810    Specimen:  Swab from Nasopharynx Updated:  08/14/20 1830    Narrative:       The following orders were created for panel order COVID PRE-OP / PRE-PROCEDURE SCREENING ORDER (NO ISOLATION) - Swab, Nasopharynx.  Procedure                               Abnormality         Status                     ---------                               -----------         ------                     COVID-19, ABBOTT IN-HOUS...[[id number]]                      In process                   Please view results for these tests on the individual orders.    COVID-19, ABBOTT IN-HOUSE,NP Swab (NO TRANSPORT MEDIA) 2 HR TAT - Swab, Nasopharynx [[id number]] Collected:  08/14/20 1810    Specimen:  Swab from Nasopharynx Updated:  08/14/20 1830    Urinalysis, Microscopic Only - Urine, Clean Catch [[id number]]  (Abnormal) Collected:  08/14/20 1648    Specimen:  Urine, Clean Catch Updated:  08/14/20 1700     RBC, UA 6-12 /HPF      WBC, UA 0-2 /HPF      Bacteria, UA None Seen /HPF      Squamous Epithelial Cells, UA 0-2 /HPF      Hyaline Casts, UA 0-2 /LPF      Methodology Automated Microscopy    Urinalysis With Culture If Indicated - Urine, Clean Catch [[id number]]  (Abnormal) Collected:  08/14/20 1648    Specimen:  Urine, Clean Catch Updated:  08/14/20 1659     Color, UA Yellow     Appearance, UA Clear     pH, UA <=5.0     Specific Gravity, UA 1.020     Glucose, UA Negative     Ketones,  UA Negative     Bilirubin, UA Negative     Blood, UA Trace     Protein, UA Negative     Leuk Esterase, UA Negative     Nitrite, UA Negative     Urobilinogen, UA 1.0 E.U./dL    Comprehensive Metabolic Panel [[id number]]  (Abnormal) Collected:  08/14/20 1549    Specimen:  Blood Updated:  08/14/20 1615     Glucose 135 mg/dL      BUN 23 mg/dL      Creatinine 0.65 mg/dL      Sodium 140 mmol/L      Potassium 4.3 mmol/L      Chloride 98 mmol/L      CO2 29.0 mmol/L      Calcium 9.3 mg/dL      Total Protein 6.3 g/dL      Albumin 3.90 g/dL      ALT (SGPT) 9 U/L      AST (SGOT) 12 U/L      Alkaline Phosphatase 77 U/L      Total Bilirubin 0.2 mg/dL      eGFR Non African Amer 88 mL/min/1.73      eGFR  African Amer 106 mL/min/1.73      Globulin 2.4 gm/dL      A/G Ratio 1.6 g/dL      BUN/Creatinine Ratio 35.4     Anion Gap 13.0 mmol/L     CBC Auto Differential [[id number]]  (Abnormal) Collected:  08/14/20 1549    Specimen:  Blood Updated:  08/14/20 1559     WBC 9.02 10*3/mm3      RBC 3.25 10*6/mm3      Hemoglobin 8.8 g/dL      Hematocrit 28.2 %      MCV 86.8 fL      MCH 27.1 pg      MCHC 31.2 g/dL      RDW 15.7 %      RDW-SD 49.3 fl      MPV 9.4 fL      Platelets 413 10*3/mm3      Neutrophil % 78.8 %      Lymphocyte % 11.0 %      Monocyte % 7.3 %      Eosinophil % 1.9 %      Basophil % 0.4 %      Immature Grans % 0.6 %      Neutrophils, Absolute 7.11 10*3/mm3      Lymphocytes, Absolute 0.99 10*3/mm3      Monocytes, Absolute 0.66 10*3/mm3      Eosinophils, Absolute 0.17 10*3/mm3      Basophils, Absolute 0.04 10*3/mm3      Immature Grans, Absolute 0.05 10*3/mm3      nRBC 0.0 /100 WBC         Imaging Results (Last 24 Hours)     Procedure Component Value Units Date/Time    XR Ankle 3+ View Right [[id number]] Resulted:  08/14/20 1815     Updated:  08/14/20 1831    CT Thoracic Spine Without Contrast [[id number]] Collected:  08/14/20 1705     Updated:  08/14/20 1713    Narrative:       Exam: CT THORACIC SPINE WO CONTRAST-     Indication:  T/L-spine trauma, minor-mod, low back pain     Comparison: None     Dose length product: 641 mGy cm. Automated exposure control was also  utilized to decrease patient radiation dose.     Findings:     Vertebral body heights are maintained. No acute fracture or subluxation.  Mild dextrocurvature the lumbar spine is noted. Moderate multilevel  degenerative change characterized by endplate osteophytes and facet  arthropathy. Included lungs are clear. No visible pneumothorax.       Impression:       Impression:     No acute fracture or subluxation. Mild dextrocurvature and moderate  multilevel degenerative change of the thoracic spine.  This report was finalized on 08/14/2020 17:10 by Dr. Blane Pacheco MD.    CT Lumbar Spine Without Contrast [[id number]] Collected:  08/14/20 1657     Updated:  08/14/20 1704    Narrative:       EXAMINATION:   CT LUMBAR SPINE WO CONTRAST-  8/14/2020 4:57 PM CDT     HISTORY: CT lumbar spine without contrast 8/14/2020 3:57 PM CDT     History: T/L-spine trauma, minor-mod, low back pain     Comparison: None      DLP: 544 mGy cm     Technique: Serial helical tomographic images of the lumbar spine were  obtained without the use of intravenous contrast. Additionally, sagittal  and coronal reformatted images were provided for review.      Findings:   Bilateral pars defects are present at L5. There is a 10 mm  anterolisthesis of L5 on S1. The remainder of the lumbar vertebra are  relatively aligned.. Vertebral body heights are well maintained. Vacuum  phenomenon is present at all disc spaces.. There is no bony narrowing of  the spinal canal or neuroforamina. Vascular calcifications present  abdominal aorta..      SI joints are unremarkable.        Impression:       Impression:   1. Degenerative spondylosis is noted with degenerative disc disease at  all levels. Bilateral pars defects at L5 with 10 mm anterolisthesis of  L5 on S1.        This report was finalized on 08/14/2020 17:01 by Dr. James  MD Melly.    CT Cervical Spine Without Contrast [[id number]] Collected:  08/14/20 1638     Updated:  08/14/20 1651    Narrative:       Exam: CT CERVICAL SPINE WO CONTRAST-     Indication: C-spine trauma, NEXUS/CCR positive, +risk factor(s)     Comparison: None     Dose length product: 290 mGy cm. Automated exposure control was also  utilized to decrease patient radiation dose.     Findings:     Skull base relationships are maintained. Odontoid process is intact.  There is reversal of normal cervical lordosis. 3 mm anterolisthesis of  C3 on C4 and C4 on C5. Vertebral body heights are maintained. No acute  fracture. Severe multilevel degenerative change with varying degrees of  moderate to severe multilevel neural foraminal stenosis. No acute soft  tissue finding. No apical pneumothorax.       Impression:       Impression:  1.  No acute fracture.  2.  3 mm anterolisthesis of C3 on C4 and C4 on C5, likely related to  degenerative change.  3.  Reversal of normal cervical lordosis.  4.  Severe multilevel cervical spine degenerative change with multilevel  neural foraminal stenosis.     This report was finalized on 08/14/2020 16:48 by Dr. Blane Pacheco MD.    CT Head Without Contrast [[id number]] Collected:  08/14/20 1633     Updated:  08/14/20 1640    Narrative:       Exam: CT HEAD WO CONTRAST-     Indication: Head trauma, headache     Comparison: None     Dose length product: 758 mGy cm. Automated exposure control was also  utilized to decrease patient radiation dose.     Findings:     No acute intracranial hemorrhage. No loss of gray-white differentiation.  Chronic microvascular ischemic white matter change and global cerebral  volume loss is noted. Ex vacuo ventricular dilatation. Basilar cisterns  are patent. No midline shift or mass effect. Visualized portion of the  orbits appear unremarkable. The mastoid air cells and visualized  paranasal sinuses are clear. No acute osseous finding.       Impression:        Impression:     1.  No acute intracranial findings.  2.  Global cerebral volume loss and presumed chronic microvascular  ischemic white matter change. The degree of ventricular dilatation is  questionably disproportionate to the background of underlying volume  loss raising the possibility of normal pressure hydrocephalus. Please  clinically correlate.  This report was finalized on 08/14/2020 16:37 by Dr. Blane Pacheco MD.          I have personally reviewed and interpreted the radiology studies and ECG obtained at time of admission.     Assessment / Plan     Assessment:   Active Hospital Problems    Diagnosis   • Ataxia   • Falls frequently   • Primary osteoarthritis   • Abnormal CT scan   • Essential hypertension   • Normocytic anemia   • Acute pain due to trauma, fall   • Chronic pain syndrome, osteoarthritis       Plan:   1.  Admit as inpatient  2.  MRI of the brain without contrast in a.m.  3.  Consult PT/OT and  in a.m.  4.  DVT prophylaxis with SCDs  5.  Home medications reviewed and restarted as appropriate, holding diuretics  6.  Add Toradol 15 mg IV every 6 hours as needed for pain-continue home [place]  7.  Labs in a.m.  8.  Anemia substrates and stool for occult blood  9.  Gentle hydration with normal saline at 50 mL/hour\   COVID study pending      I discussed the patient's findings and my recommendations with: Ventura Patten MD  Time spent 35 minutes    Patient seen and examined by me on 8/14/2020 at 5:47 PM.    Electronically signed by BETTY Sue, 08/14/20, 18:31.    Electronically signed by Georgina Green APRN at 08/14/20 1842              Speech Language Pathology Notes (last 48 hours) (Notes from 08/13/20 1506 through 08/15/20 1506)      Kimmy Nascimenot MS CCC-SLP at 08/15/20 1127            Problem: Patient Care Overview  Goal: Plan of Care Review  Outcome: Ongoing (interventions implemented as appropriate)  Flowsheets (Taken 8/15/2020 1116)  Progress: no change  "(Initial Evaluation)  Plan of Care Reviewed With: patient; other (see comments) (BEATRICE Choudhury)  Note:   The patient was seen on this day for a cognitive assessment at request of MD and family due to concern with safety during activities of daily living (ADL's). The patient currently lives alone and has had several recent falls and a recent UTI. Currently admitted for fall and CT shows multi level spinal degenerative disease and possible normal pressure hydrocephalus. The patient is alert and requesting to go home upon SLP arrival. She is oriented to person and time. She states we are in [place]; however, given cues is able to state [place]. She then states we are at [facility], given cues states, \"well maybe [facility].\" Following standardized assessment upon asking orientation of place again she is able to state [facility].     MMSE:  The Mini Mental State Examination (MMSE) is a tool that can be used to systematically and thoroughly assess mental status with older,  community dwelling, hospitalized and institutionalized adults. It is an 11-question measure that tests five areas of cognitive function:  orientation, registration, attention and calculation, recall, and language. The maximum score is 30. A score of 23 or lower is indicative of cognitive impairment.     Severity Rating Score Assessment and Function   Questionably Significant (25-30) 26 If clinical signs of cognitive impairment are present, in depth cognitive assessment may be valuable.  May have clinically significant but mild deficits in day to day functioning.  Likely to affect only most demanding activities of daily living.   Mild (20-25)  In depth assessment may be helpful to better determine pattern and extent of deficits.  Significant effect in day to day functioning.  May require supervision, support and assistance.   Moderate (10-20)  In depth assessment may be helpful if there are specific clinical indications.  Clear impairment in day to day " "functioning.  May require 24 hour supervision.   Severe (0-10)  Pt not likely to be able to participate in in-depth testing.  Marked impairment in day to day functioning.  Likely to require 24 hour supervision and assistance with ADLs.   Comments: Upon further assessment, the patient reveals she at times has difficulties with ADL's and requires assistance for most tasks at home. She has mild difficulty with recall and is unable to answer some simple safety questions regarding the home environment. Although the patient scored within the normal range, SLP feels that patient would benefit from skilled speech/cognitive interventions targeting problem solving during ADL's, home management tasks, and orientation with use of external aids. Recommend at least assistance upon discharge and setup of safety measures within that environment.       Thank you for this referral and for allowing me to participate in the care of this patient.        Electronically signed by Kimmy Nascimento, MS CCC-SLP at 08/15/20 1127     Kimmy Nascimento, MS CCC-SLP at 08/15/20 111          Acute Care - Speech Language Pathology Initial Evaluation  [facility]     Patient Name: Betty Dennis  : 1940  MRN: [id number]  Today's Date: 8/15/2020               Admit Date: 2020   The patient was seen on this day for a cognitive assessment at request of MD and family due to concern with safety during activities of daily living (ADL's). The patient currently lives alone and has had several recent falls and a recent UTI. Currently admitted for fall and CT shows multi level spinal degenerative disease and possible normal pressure hydrocephalus. The patient is alert and requesting to go home upon SLP arrival. She is oriented to person and time. She states we are in [place]; however, given cues is able to state [place]. She then states we are at [facility], given cues states, \"well maybe [facility].\" Following standardized assessment upon asking " orientation of place again she is able to state [facility].     MMSE:  The Mini Mental State Examination (MMSE) is a tool that can be used to systematically and thoroughly assess mental status with older,  community dwelling, hospitalized and institutionalized adults. It is an 11-question measure that tests five areas of cognitive function:  orientation, registration, attention and calculation, recall, and language. The maximum score is 30. A score of 23 or lower is indicative of cognitive impairment.     Severity Rating Score Assessment and Function   Questionably Significant (25-30) 26 If clinical signs of cognitive impairment are present, in depth cognitive assessment may be valuable.  May have clinically significant but mild deficits in day to day functioning.  Likely to affect only most demanding activities of daily living.   Mild (20-25)  In depth assessment may be helpful to better determine pattern and extent of deficits.  Significant effect in day to day functioning.  May require supervision, support and assistance.   Moderate (10-20)  In depth assessment may be helpful if there are specific clinical indications.  Clear impairment in day to day functioning.  May require 24 hour supervision.   Severe (0-10)  Pt not likely to be able to participate in in-depth testing.  Marked impairment in day to day functioning.  Likely to require 24 hour supervision and assistance with ADLs.   Comments: Upon further assessment, the patient reveals she at times has difficulties with ADL's and requires assistance for most tasks at home. She has mild difficulty with recall and is unable to answer some simple safety questions regarding the home environment. Although the patient scored within the normal range, SLP feels that patient would benefit from skilled speech/cognitive interventions targeting problem solving during ADL's, home management tasks, and orientation with use of external aids. Recommend at least assistance  upon discharge and setup of safety measures within that environment.       Thank you for this referral and for allowing me to participate in the care of this patient.   Kimmy Nascimento, MS CCC-SLP 8/15/2020 11:29    Visit Dx:    ICD-10-CM ICD-9-CM   1. Ataxia R27.0 781.3   2. Abnormal CT of the head R93.0 793.0   3. Anemia, unspecified type D64.9 285.9   4. Impaired cognition R41.89 294.9     Patient Active Problem List   Diagnosis   • Ataxia   • Falls frequently   • Primary osteoarthritis   • Abnormal CT scan   • Essential hypertension   • Normocytic anemia   • Acute pain due to trauma, fall   • Chronic pain syndrome, osteoarthritis     Past Medical History:   Diagnosis Date   • Chronic pain syndrome, secondary to OA    • Frequent falls    • Hypertension    • Hypothyroidism    • Osteoarthritis    • Pseudogout      Past Surgical History:   Procedure Laterality Date   • BREAST SURGERY     • HIP BIPOLAR REPLACEMENT      bilateral        SLP EVALUATION (last 72 hours)      SLP [facility] Evaluation     Row Name 08/15/20 1024                   Communication Assessment/Intervention    Document Type  evaluation  -MM        Subjective Information  no complaints  -MM        Patient Observations  alert;cooperative;agree to therapy  -MM        Patient/Family Observations  No family present.   -MM        Patient Effort  good  -MM        Symptoms Noted During/After Treatment  none  -MM           General Information    Patient Profile Reviewed  yes  -MM        Pertinent History Of Current Problem  Primary probelm: Ataxia, recent falls, recent UTI, CT with multi level spinal degenerative disease and possible normal pressure hydrocephalus.   -MM        Precautions/Limitations, Vision  WFL;for purposes of eval  -MM        Precautions/Limitations, Hearing  WFL;for purposes of eval  -MM        Prior Level of Function-Communication  WFL  -MM        Plans/Goals Discussed with  patient  -MM        Barriers to Rehab  none identified  -MM         Patient's Goals for Discharge  return to home  -MM        Standardized Assessment Used  MMSE  -MM           Pain Assessment    Additional Documentation  Pain Scale: FACES Pre/Post-Treatment (Group)  -MM           Pain Scale: FACES Pre/Post-Treatment    Pain: FACES Scale, Pretreatment  0-->no hurt  -MM        Pain: FACES Scale, Post-Treatment  0-->no hurt  -MM           Cognitive Assessment Intervention- SLP    Cognitive Function (Cognition)  mild impairment  -MM        Orientation Status (Cognition)  person;WFL;place;mild impairment  -MM        Memory (Cognitive)  simple;immediate;delayed;WFL  -MM        Attention (Cognitive)  sustained;WFL  -MM        Thought Organization (Cognitive)  mental manipulation;WFL  -MM        Executive Function (Cognition)  self-monitoring/correction;mild impairment  -MM        Pragmatics (Communication)  WFL  -MM        Right Hemisphere Function  safety awareness;mild impairment  -MM           SLP Clinical Impressions    SLP Diagnosis  Mild cognitive impairment   -MM        Rehab Potential/Prognosis  good  -MM        SLC Criteria for Skilled Therapy Interventions Met  yes  -MM        Functional Impact  functional impact in ADLs;needs occasional supervision;decreased ability to respond to situations safely;difficulty completing home management task  -MM           Recommendations    Therapy Frequency (SLP SLC)  PRN  -MM        Predicted Duration Therapy Intervention (Days)  until discharge  -MM        Anticipated Dischage Disposition (SLP)  unknown Needs assist for safety and ADL's upon d/c  -MM           Communication Treatment Objective and Progress Goals (SLP)    Cognitive Linguistic Treatment Objectives  Cognitive Linguistic Treatment Objectives (Group)  -MM           Cognitive Linguistic Treatment Objectives    Orientation Selection  orientation, SLP goal 1  -MM        Problem Solving Selection  problem solving, SLP goal 1  -MM           Orientation Goal 1 (SLP)    Improve  "Orientation Through Goal 1 (SLP)  demonstrating orientation to place;demonstrating orientation to disease/impairment;use environmental aids to assist with orientation;independently (over 90% accuracy)  -MM        Time Frame (Orientation Goal 1, SLP)  by discharge  -MM        Barriers (Orientation Goal 1, SLP)  none   -MM        Progress/Outcomes (Orientation Goal 1, SLP)  goal ongoing  -MM           Functional Problem Solving Skills Goal 1 (SLP)    Improve Problem Solving Through Goal 1 (SLP)  answer \"what if\" questions;complete organization/home management task;determine solutions to complex problems;determine multiple solutions to problems;independently (over 90% accuracy)  -MM        Time Frame (Problem Solving Goal 1, SLP)  by discharge  -MM        Barriers (Problem Solving Goal 1, SLP)  none   -MM        Progress/Outcomes (Problem Solving Goal 1, SLP)  goal ongoing  -MM          User Key  (r) = Recorded By, (t) = Taken By, (c) = Cosigned By    Initials Name Effective Dates    MM Kimmy Nascimento, MS CCC-SLP 07/12/20 -              EDUCATION  The patient has been educated in the following areas:     Cognitive Impairment.    SLP Recommendation and Plan  SLP Diagnosis: Mild cognitive impairment      SLC Criteria for Skilled Therapy Interventions Met: yes  Anticipated Dischage Disposition (SLP): unknown(Needs assist for safety and ADL's upon d/c)     Predicted Duration Therapy Intervention (Days): until discharge    Plan of Care Reviewed With: patient, other (see comments)(BEATRICE Choudhury)  Progress: no change(Initial Evaluation)      SLP GOALS     Row Name 08/15/20 1024             Orientation Goal 1 (SLP)    Improve Orientation Through Goal 1 (SLP)  demonstrating orientation to place;demonstrating orientation to disease/impairment;use environmental aids to assist with orientation;independently (over 90% accuracy)  -MM      Time Frame (Orientation Goal 1, SLP)  by discharge  -MM      Barriers (Orientation Goal 1, SLP) " " none   -MM      Progress/Outcomes (Orientation Goal 1, SLP)  goal ongoing  -MM         Functional Problem Solving Skills Goal 1 (SLP)    Improve Problem Solving Through Goal 1 (SLP)  answer \"what if\" questions;complete organization/home management task;determine solutions to complex problems;determine multiple solutions to problems;independently (over 90% accuracy)  -MM      Time Frame (Problem Solving Goal 1, SLP)  by discharge  -MM      Barriers (Problem Solving Goal 1, SLP)  none   -MM      Progress/Outcomes (Problem Solving Goal 1, SLP)  goal ongoing  -MM        User Key  (r) = Recorded By, (t) = Taken By, (c) = Cosigned By    Initials Name Provider Type    Kimmy Hernandez MS CCC-SLP Speech and Language Pathologist                  Time Calculation:     Time Calculation- SLP     Row Name 08/15/20 1128             Time Calculation- SLP    SLP Start Time  1020  -MM      SLP Stop Time  1128  -MM      SLP Time Calculation (min)  68 min  -MM      SLP Received On  08/15/20  -MM      SLP Goal Re-Cert Due Date  08/25/20  -MM        User Key  (r) = Recorded By, (t) = Taken By, (c) = Cosigned By    Initials Name Provider Type    Kimmy Hernandez MS CCC-SLP Speech and Language Pathologist          Therapy Charges for Today     Code Description Service Date Service Provider Modifiers Qty    19860736119 HC ST EVAL SPEECH AND PROD W LANG  5 8/15/2020 Kimmy Nascimento MS CCC-SLP GN 1                     Kimmy Nascimento MS CCC-SLP  8/15/2020    Electronically signed by Kimmy Nascimento MS CCC-SLP at 08/15/20 1129       "

## 2020-08-15 NOTE — THERAPY EVALUATION
Patient Name: Betty Dennis  : 1940    MRN: [id number]                              Today's Date: 8/15/2020       Admit Date: 2020    Visit Dx:     ICD-10-CM ICD-9-CM   1. Ataxia R27.0 781.3   2. Abnormal CT of the head R93.0 793.0   3. Anemia, unspecified type D64.9 285.9   4. Impaired cognition R41.89 294.9   5. Decreased activities of daily living (ADL) Z78.9 V49.89   6. Impaired mobility Z74.09 799.89     Patient Active Problem List   Diagnosis   • Ataxia   • Falls frequently   • Primary osteoarthritis   • Abnormal CT scan   • Essential hypertension   • Normocytic anemia   • Acute pain due to trauma, fall   • Chronic pain syndrome, osteoarthritis     Past Medical History:   Diagnosis Date   • Chronic pain syndrome, secondary to OA    • Frequent falls    • Hypertension    • Hypothyroidism    • Osteoarthritis    • Pseudogout      Past Surgical History:   Procedure Laterality Date   • BREAST SURGERY     • HIP BIPOLAR REPLACEMENT      bilateral     General Information     Row Name 08/15/20 1312          PT Evaluation Time/Intention    Document Type  evaluation Fall now with acute on chronic neck, back, and R ankle pain. Dx: Ataxia, frequent falls.   -CAMILLA     Mode of Treatment  physical therapy  -CAMILLA     Row Name 08/15/20 1312          General Information    Patient Profile Reviewed?  yes  -CAMILLA     Prior Level of Function  independent:;all household mobility;min assist:;dressing;bathing reports gradual physical decline the last month  -CAMILLA     Existing Precautions/Restrictions  fall  -CAMILLA     Barriers to Rehab  medically complex  -CAMILLA     Row Name 08/15/20 1312          Relationship/Environment    Lives With  alone Has help during the day, Monday-Friday  -CAMILLA     Row Name 08/15/20 1312          Resource/Environmental Concerns    Current Living Arrangements  home/apartment/condo  -CAMILLA     Row Name 08/15/20 1312          Home Main Entrance    Number of Stairs, Main Entrance  none ramp, walk in shower  -CAMILLA [place]  Name 08/15/20 1312          Cognitive Assessment/Intervention- PT/OT    Orientation Status (Cognition)  oriented x 4  -CAMILLA     Row Name 08/15/20 1312          Safety Issues, Functional Mobility    Impairments Affecting Function (Mobility)  balance;endurance/activity tolerance;strength;coordination;postural/trunk control  -CAMILLA       User Key  (r) = Recorded By, (t) = Taken By, (c) = Cosigned By    Initials Name Provider Type    Austin Espino PT DPT Physical Therapist        Mobility     Row Name 08/15/20 1312          Bed Mobility Assessment/Treatment    Bed Mobility Assessment/Treatment  supine-sit  -CAMILLA     Supine-Sit [place] (Bed Mobility)  moderate assist (50% patient effort)  -CAMILLA     Assistive Device (Bed Mobility)  head of bed elevated;draw sheet  -CAMILLA     Row Name 08/15/20 1312          Sit-Stand Transfer    Sit-Stand [place] (Transfers)  minimum assist (75% patient effort);2 person assist  -CAMILLA     Assistive Device (Sit-Stand Transfers)  walker, front-wheeled  -CAMILLA     Row Name 08/15/20 1312          Gait/Stairs Assessment/Training    [place] Level (Gait)  minimum assist (75% patient effort);1 person assist;2 person assist  -CAMILLA     Assistive Device (Gait)  walker, front-wheeled  -CAMILLA     Distance in Feet (Gait)  25  -CAMILLA     Pattern (Gait)  swing-through  -CAMILLA     Deviations/Abnormal Patterns (Gait)  tracey decreased;gait speed decreased  -CAMILLA     Comment (Gait/Stairs)  (S) 1 significant LOB to the right needing mod assist x 2 to maintain dynamic balance, high risk for falls.  -CAMILLA       User Key  (r) = Recorded By, (t) = Taken By, (c) = Cosigned By    Initials Name Provider Type    Austin Espino PT DPT Physical Therapist        Obj/Interventions     Row Name 08/15/20 1312          General ROM    GENERAL ROM COMMENTS  B LE AROM WFL  -CAMILLA     Row Name 08/15/20 1312          MMT (Manual Muscle Testing)    General MMT Comments  B LE functionally 4-/5  -CAMILLA     Row Name 08/15/20 1312           Static Sitting Balance    Level of [place] (Unsupported Sitting, Static Balance)  contact guard assist  -CAMILLA     Sitting Position (Unsupported Sitting, Static Balance)  sitting on edge of bed  -CAMILLA     Row Name 08/15/20 1312          Dynamic Sitting Balance    Level of [place], Reaches Outside Midline (Sitting, Dynamic Balance)  contact guard assist  -CAMILLA     Sitting Position, Reaches Outside Midline (Sitting, Dynamic Balance)  sitting on edge of bed  -CAMILLA     [facility] Name 08/15/20 1312          Static Standing Balance    Level of [place] (Supported Standing, Static Balance)  minimal assist, 75% patient effort;2 person assist  -CAMILLA     Assistive Device Utilized (Supported Standing, Static Balance)  walker, rolling  -CAMILLA     [facility] Name 08/15/20 1312          Dynamic Standing Balance    Level of [place], Reaches Outside Midline (Standing, Dynamic Balance)  moderate assist, 50 to 74% patient effort;2 person assist  -CAMILLA     Assistive Device Utilized (Supported Standing, Dynamic Balance)  walker, rolling  -CAMILLA     Row Name 08/15/20 1312          Sensory Assessment/Intervention    Sensory General Assessment  no sensation deficits identified B LE intact to light touch  -CAMILLA       User Key  (r) = Recorded By, (t) = Taken By, (c) = Cosigned By    Initials Name Provider Type    Austin Espino, PT DPT Physical Therapist        Goals/Plan     Row Name 08/15/20 1312          Bed Mobility Goal 1 (PT)    Activity/Assistive Device (Bed Mobility Goal 1, PT)  sit to supine/supine to sit  -CAMILLA     [place] Level/Cues Needed (Bed Mobility Goal 1, PT)  minimum assist (75% or more patient effort)  -CAMILLA     Time Frame (Bed Mobility Goal 1, PT)  long term goal (LTG);10 days  -CAMILLA     Progress/Outcomes (Bed Mobility Goal 1, PT)  goal ongoing  -CAMILLA     Row Name 08/15/20 1312          Transfer Goal 1 (PT)    Activity/Assistive Device (Transfer Goal 1, PT)  sit-to-stand/stand-to-sit;bed-to-chair/chair-to-bed;walker, rolling  -CAMILLA      [place] Level/Cues Needed (Transfer Goal 1, PT)  contact guard assist  -CAMILLA     Time Frame (Transfer Goal 1, PT)  long term goal (LTG);10 days  -CAMILLA     Progress/Outcome (Transfer Goal 1, PT)  goal ongoing  -CAMILLA     Row Name 08/15/20 1312          Gait Training Goal 1 (PT)    Activity/Assistive Device (Gait Training Goal 1, PT)  gait (walking locomotion);assistive device use;decrease fall risk;improve balance and speed;increase endurance/gait distance No sway or LOB  -CAMILLA     [place] Level (Gait Training Goal 1, PT)  contact guard assist  -CAMILLA     Distance (Gait Goal 1, PT)  50  -CAMILLA     Time Frame (Gait Training Goal 1, PT)  long term goal (LTG);10 days  -CAMILLA     Progress/Outcome (Gait Training Goal 1, PT)  goal ongoing  -CAMILLA       User Key  (r) = Recorded By, (t) = Taken By, (c) = Cosigned By    Initials Name Provider Type    Austin Espino, PT DPT Physical Therapist        Clinical Impression     [facility] Name 08/15/20 1312          Pain Assessment    Additional Documentation  Pain Scale: Numbers Pre/Post-Treatment (Group)  -CAMILLA     Row Name 08/15/20 1312          Pain Scale: Numbers Pre/Post-Treatment    Pain Scale: Numbers, Pretreatment  5/10  -CAMILLA     Pain Location - Orientation  generalized  -CAMILLA     Pain Intervention(s)  Repositioned;Ambulation/increased activity  -CAMILLA     Row Name 08/15/20 1312          Plan of Care Review    Plan of Care Reviewed With  patient  -CAMILLA     Row Name 08/15/20 1312          Physical Therapy Clinical Impression    Patient/Family Goals Statement (PT Clinical Impression)  go home vs SNF for therapy.   -CAMILLA     Criteria for Skilled Interventions Met (PT Clinical Impression)  yes;treatment indicated  -CAMILLA     Rehab Potential (PT Clinical Summary)  good, to achieve stated therapy goals  -CAMILLA     Predicted Duration of Therapy (PT)  until d/c  -CAMILLA     Row Name 08/15/20 1312          Positioning and Restraints    Pre-Treatment Position  in bed  -CAMILLA     Post Treatment Position  chair  -CAMILLA      In Chair  sitting;call light within reach;encouraged to call for assist  -CAMILLA       User Key  (r) = Recorded By, (t) = Taken By, (c) = Cosigned By    Initials Name Provider Type    Austin Espino PT DPT Physical Therapist        Outcome Measures     Row Name 08/15/20 1312          How much help from another person do you currently need...    Turning from your back to your side while in flat bed without using bedrails?  2  -CAMILLA     Moving from lying on back to sitting on the side of a flat bed without bedrails?  2  -CAMILLA     Moving to and from a bed to a chair (including a wheelchair)?  2  -CAMILLA     Standing up from a chair using your arms (e.g., wheelchair, bedside chair)?  2  -CAMILLA     Climbing 3-5 steps with a railing?  1  -CAMILLA     To walk in hospital room?  2  -CAMILLA     AM-PAC 6 Clicks Score (PT)  11  -CAMILLA     Row Name 08/15/20 1312          Functional Assessment    Outcome Measure Options  AM-PAC 6 Clicks Basic Mobility (PT)  -CAMILLA       User Key  (r) = Recorded By, (t) = Taken By, (c) = Cosigned By    Initials Name Provider Type    Austin Espino PT DPT Physical Therapist        Physical Therapy Education                 Title: PT OT SLP Therapies (In Progress)     Topic: Physical Therapy (In Progress)     Point: Mobility training (In Progress)     Description:   Instruct learner(s) on safety and technique for assisting patient out of bed, chair or wheelchair.  Instruct in the proper use of assistive devices, such as walker, crutches, cane or brace.              Patient Friendly Description:   It's important to get you on your feet again, but we need to do so in a way that is safe for you. Falling has serious consequences, and your personal safety is the most important thing of all.        When it's time to get out of bed, one of us or a family member will sit next to you on the bed to give you support.     If your doctor or nurse tells you to use a walker, crutches, a cane, or a brace, be sure you use it  every time you get out of bed, even if you think you don't need it.    Learning Progress Summary           Patient Acceptance, E, NR by CAMILLA at 8/15/2020 1312    Comment:  EDucated pt on progression of PT POC and benefits of activity                   Point: Home exercise program (Not Started)     Description:   Instruct learner(s) on appropriate technique for monitoring, assisting and/or progressing patient with therapeutic exercises and activities.              Learner Progress:   Not documented in this visit.          Point: Body mechanics (Not Started)     Description:   Instruct learner(s) on proper positioning and spine alignment for patient and/or caregiver during mobility tasks and/or exercises.              Learner Progress:   Not documented in this visit.          Point: Precautions (Not Started)     Description:   Instruct learner(s) on prescribed precautions during mobility and gait tasks              Learner Progress:   Not documented in this visit.                      User Key     Initials Effective Dates Name Provider Type Discipline    CAMILLA 08/02/16 -  Austin Rea, PT DPT Physical Therapist PT              PT Recommendation and Plan  Planned Therapy Interventions (PT Eval): bed mobility training, transfer training, gait training, balance training, home exercise program, patient/family education, postural re-education, strengthening  Outcome Summary/Treatment Plan (PT)  Anticipated Discharge Disposition (PT): skilled nursing facility  Plan of Care Reviewed With: patient  Outcome Summary: PT eval completed. She presents alert and oriented x 4. She needed mod assist x 1 to get to the EOB and min assist x 2 to stand and ambulate. She was able to ambulate ~ 20-25 ft with a RW. She experienced 1 significant LOB to the right needing mod assist x 2 to maintain dynamic balance. She continues to be a high risk for falls and demos decreased safety awareness. PT will cont with gait, balance, and strength. She  will need SNF placement for continued therapy.     Time Calculation:   PT Charges     Row Name 08/15/20 1520             Time Calculation    Start Time  1420  -CAMILLA      Stop Time  1500  -CAMILLA      Time Calculation (min)  40 min  -CAMILLA      PT Received On  08/15/20  -CAMILLA      PT Goal Re-Cert Due Date  08/25/20  -CAMILLA        User Key  (r) = Recorded By, (t) = Taken By, (c) = Cosigned By    Initials Name Provider Type    Austin Espino, PT DPT Physical Therapist        Therapy Charges for Today     Code Description Service Date Service Provider Modifiers Qty    52156623471 HC PT EVAL MOD COMPLEXITY 3 8/15/2020 Austin Rea, PT DPT GP 1          PT G-Codes  Outcome Measure Options: AM-PAC 6 Clicks Daily Activity (OT)  AM-PAC 6 Clicks Score (PT): 11  AM-PAC 6 Clicks Score (OT): 16    Austin Rea, NOREEN DPT  8/15/2020

## 2020-08-15 NOTE — PROGRESS NOTES
"    [facility] Medicine Services  INPATIENT PROGRESS NOTE    Patient Name: Betty Dennis  Date of Admission: 8/14/2020  Today's Date: 08/15/20  Length of Stay: 1  Primary Care Physician: Hardik Taveras DO    Subjective   Chief Complaint: \"hurting less\"  HPI     Patient was seen and examined at bedside.  Patient indicates that her right ankle feels much better today.  She has no pain in her right shoulder.  Patient indicates she overall feels better.  She has indicated that she has worked with physical therapy today.  She is also work with speech therapy.  These reports are pending.  Patient denies any significant issues at this time.  Patient indicates that she is agreeable for placement.  She understands that she is not strong enough to be able to maintain at home        Review of Systems   Constitutional: Negative for activity change, chills, fatigue and fever.   Respiratory: Negative for cough and shortness of breath.    Cardiovascular: Negative for chest pain and palpitations.   Gastrointestinal: Negative for abdominal distention, abdominal pain, constipation, diarrhea, nausea and vomiting.   Neurological: Positive for tremors and weakness.        All pertinent negatives and positives are as above. All other systems have been reviewed and are negative unless otherwise stated.     Objective    Temp:  [98 °F (36.7 °C)-98.6 °F (37 °C)] 98 °F (36.7 °C)  Heart Rate:  [] 100  Resp:  [16-18] 16  BP: (113-148)/(48-74) 133/61  Physical Exam   Constitutional: She is oriented to person, place, and time. No distress.   HENT:   Head: Normocephalic and atraumatic.   Eyes: Conjunctivae are normal. No scleral icterus.   Neck: Neck supple. No JVD present.   Cardiovascular: Regular rhythm and intact distal pulses.   Murmur heard.  Mild tachycardia   Pulmonary/Chest: Effort normal and breath sounds normal. No stridor. No respiratory distress. She has no wheezes. She has no rales.   "   Abdominal: Soft. Bowel sounds are normal. She exhibits no distension and no mass. There is no tenderness. There is no guarding.   Musculoskeletal: She exhibits no edema.   Neurological: She is alert and oriented to person, place, and time.   Skin: Skin is warm and dry. Capillary refill takes less than 2 seconds. She is not diaphoretic. No erythema. There is pallor.   Psychiatric: She has a normal mood and affect. Her behavior is normal.   Nursing note and vitals reviewed.          Results Review:  I have reviewed the labs, radiology results, and diagnostic studies.    Laboratory Data:   Results from last 7 days   Lab Units 08/15/20  0507 08/14/20  1549   WBC 10*3/mm3 9.99 9.02   HEMOGLOBIN g/dL 8.3* 8.8*   HEMATOCRIT % 26.4* 28.2*   PLATELETS 10*3/mm3 384 413        Results from last 7 days   Lab Units 08/15/20  0507 08/14/20  1549   SODIUM mmol/L 141 140   POTASSIUM mmol/L 3.9 4.3   CHLORIDE mmol/L 102 98   CO2 mmol/L 28.0 29.0   BUN mg/dL 21 23   CREATININE mg/dL 0.58 0.65   CALCIUM mg/dL 9.4 9.3   BILIRUBIN mg/dL 0.2 0.2   ALK PHOS U/L 71 77   ALT (SGPT) U/L 11 9   AST (SGOT) U/L 25 12   GLUCOSE mg/dL 111* 135*       Culture Data:   No results found for: BLOODCX, URINECX, WOUNDCX, MRSACX, RESPCX, STOOLCX    Radiology Data:   Imaging Results (Last 24 Hours)     Procedure Component Value Units Date/Time    MRI Brain Without Contrast [[id number]] Collected:  08/15/20 1001     Updated:  08/15/20 1009    Narrative:       Exam: MRI BRAIN WO CONTRAST-     Indication: Hydrocephalus, idiopathic, norm pressure; R27.0-Ataxia,  unspecified; R93.0-Abnormal findings on diagnostic imaging of skull and  head, not elsewhere classified; D64.9-Anemia, unspecified     Comparison: CT head 8/14/2020     Findings:     No diffusion restriction to suggest acute infarction. No increased  susceptibility to suggest intracranial hemorrhage. Major physiologic  flow voids are maintained.     Patchy and confluent periventricular and  subcortical T2 FLAIR  hyperintense white matter lesions likely represent sequela of chronic  microvascular ischemic change. No other significant abnormality of brain  parenchyma signal intensity. No midline shift or mass effect. Global  cerebral volume loss with ex vacuo ventricular dilatation is again  noted. Third ventricle measures 11 mm transverse dimension. Ventricular  caliber is stable from prior day head CT. Basilar cisterns are patent.  Sella turcica and its contents appear unremarkable.     No acute orbital finding. Trace LEFT mastoid effusion. Paranasal sinuses  appear clear.       Impression:       Impression:  1.  No acute intracranial findings. No acute infarct.  2.  Chronic microvascular ischemic white matter change and global  cerebral volume loss. Presumed ex vacuo ventricular dilatation with  stable ventricular caliber compared to one day prior.     This report was finalized on 08/15/2020 10:06 by Dr. Blane Pacheco MD.    XR Ankle 3+ View Right [[id number]] Collected:  08/14/20 1856     Updated:  08/14/20 1904    Narrative:       Exam: XR ANKLE 3+ VW RIGHT-     Indication: Fall; R27.0-Ataxia, unspecified; R93.0-Abnormal findings on  diagnostic imaging of skull and head, not elsewhere classified;  D64.9-Anemia, unspecified     Comparison: None     Findings:     Ankle mortise is congruent. Talar dome is intact. No acute fracture or  dislocation. No suspicious bone lesion. The bones are demineralized. No  radiopaque foreign body or soft tissue gas.       Impression:       Impression:     No acute osseous findings.  This report was finalized on 08/14/2020 19:01 by Dr. Blane Pacheco MD.    CT Thoracic Spine Without Contrast [[id number]] Collected:  08/14/20 1705     Updated:  08/14/20 1713    Narrative:       Exam: CT THORACIC SPINE WO CONTRAST-     Indication: T/L-spine trauma, minor-mod, low back pain     Comparison: None     Dose length product: 641 mGy cm. Automated exposure control was  also  utilized to decrease patient radiation dose.     Findings:     Vertebral body heights are maintained. No acute fracture or subluxation.  Mild dextrocurvature the lumbar spine is noted. Moderate multilevel  degenerative change characterized by endplate osteophytes and facet  arthropathy. Included lungs are clear. No visible pneumothorax.       Impression:       Impression:     No acute fracture or subluxation. Mild dextrocurvature and moderate  multilevel degenerative change of the thoracic spine.  This report was finalized on 08/14/2020 17:10 by Dr. Blane Pacheco MD.    CT Lumbar Spine Without Contrast [[id number]] Collected:  08/14/20 1657     Updated:  08/14/20 1704    Narrative:       EXAMINATION:   CT LUMBAR SPINE WO CONTRAST-  8/14/2020 4:57 PM CDT     HISTORY: CT lumbar spine without contrast 8/14/2020 3:57 PM CDT     History: T/L-spine trauma, minor-mod, low back pain     Comparison: None      DLP: 544 mGy cm     Technique: Serial helical tomographic images of the lumbar spine were  obtained without the use of intravenous contrast. Additionally, sagittal  and coronal reformatted images were provided for review.      Findings:   Bilateral pars defects are present at L5. There is a 10 mm  anterolisthesis of L5 on S1. The remainder of the lumbar vertebra are  relatively aligned.. Vertebral body heights are well maintained. Vacuum  phenomenon is present at all disc spaces.. There is no bony narrowing of  the spinal canal or neuroforamina. Vascular calcifications present  abdominal aorta..      SI joints are unremarkable.        Impression:       Impression:   1. Degenerative spondylosis is noted with degenerative disc disease at  all levels. Bilateral pars defects at L5 with 10 mm anterolisthesis of  L5 on S1.        This report was finalized on 08/14/2020 17:01 by Dr. Jacob Pickard MD.    CT Cervical Spine Without Contrast [[id number]] Collected:  08/14/20 1638     Updated:  08/14/20 1651    Narrative:        Exam: CT CERVICAL SPINE WO CONTRAST-     Indication: C-spine trauma, NEXUS/CCR positive, +risk factor(s)     Comparison: None     Dose length product: 290 mGy cm. Automated exposure control was also  utilized to decrease patient radiation dose.     Findings:     Skull base relationships are maintained. Odontoid process is intact.  There is reversal of normal cervical lordosis. 3 mm anterolisthesis of  C3 on C4 and C4 on C5. Vertebral body heights are maintained. No acute  fracture. Severe multilevel degenerative change with varying degrees of  moderate to severe multilevel neural foraminal stenosis. No acute soft  tissue finding. No apical pneumothorax.       Impression:       Impression:  1.  No acute fracture.  2.  3 mm anterolisthesis of C3 on C4 and C4 on C5, likely related to  degenerative change.  3.  Reversal of normal cervical lordosis.  4.  Severe multilevel cervical spine degenerative change with multilevel  neural foraminal stenosis.     This report was finalized on 08/14/2020 16:48 by Dr. Blane Pacheco MD.    CT Head Without Contrast [[id number]] Collected:  08/14/20 1633     Updated:  08/14/20 1640    Narrative:       Exam: CT HEAD WO CONTRAST-     Indication: Head trauma, headache     Comparison: None     Dose length product: 758 mGy cm. Automated exposure control was also  utilized to decrease patient radiation dose.     Findings:     No acute intracranial hemorrhage. No loss of gray-white differentiation.  Chronic microvascular ischemic white matter change and global cerebral  volume loss is noted. Ex vacuo ventricular dilatation. Basilar cisterns  are patent. No midline shift or mass effect. Visualized portion of the  orbits appear unremarkable. The mastoid air cells and visualized  paranasal sinuses are clear. No acute osseous finding.       Impression:       Impression:     1.  No acute intracranial findings.  2.  Global cerebral volume loss and presumed chronic microvascular  ischemic white  matter change. The degree of ventricular dilatation is  questionably disproportionate to the background of underlying volume  loss raising the possibility of normal pressure hydrocephalus. Please  clinically correlate.  This report was finalized on 08/14/2020 16:37 by Dr. Blane Pacheco MD.          I have reviewed the patient's current medications.     Assessment/Plan     Active Hospital Problems    Diagnosis   • Ataxia   • Falls frequently   • Primary osteoarthritis   • Abnormal CT scan   • Essential hypertension   • Normocytic anemia   • Acute pain due to trauma, fall   • Chronic pain syndrome, osteoarthritis       Plan:  1.  MRI brain  2.  D/C IVF  3.  PT/OT  4.  Lovenox VTE PPx  5.  SLP consultation for cognitive evaluation  6.  Nutrition consultation for MSA  7.  SW consult for placement  8.  Will consider neurology consultation depending on MRI.  Patient noted to be diffusely weak  9.  UA unremarkable               Discharge Planning: I expect the patient to be discharged to SNF in >2 days.    Electronically signed by Sawyer Patten MD, 08/15/20, 15:10.

## 2020-08-15 NOTE — PLAN OF CARE
Problem: Patient Care Overview  Goal: Plan of Care Review  Outcome: Ongoing (interventions implemented as appropriate)  Flowsheets (Taken 8/15/2020 1580)  Progress: no change  Plan of Care Reviewed With: patient  Outcome Summary: Pt A&Ox4, VSS safety maintained. Running NSR on tele. C/o pain with relief from po prn pain medication. Safety maintained. Still needing OB stool sample. Will continue to monitor

## 2020-08-15 NOTE — PLAN OF CARE
"  Problem: Patient Care Overview  Goal: Plan of Care Review  Outcome: Ongoing (interventions implemented as appropriate)  Flowsheets (Taken 8/15/2020 1116)  Progress: no change (Initial Evaluation)  Plan of Care Reviewed With: patient; other (see comments) (BEATRICE Choudhury)  Note:   The patient was seen on this day for a cognitive assessment at request of MD and family due to concern with safety during activities of daily living (ADL's). The patient currently lives alone and has had several recent falls and a recent UTI. Currently admitted for fall and CT shows multi level spinal degenerative disease and possible normal pressure hydrocephalus. The patient is alert and requesting to go home upon SLP arrival. She is oriented to person and time. She states we are in [place]; however, given cues is able to state [place]. She then states we are at [facility], given cues states, \"well maybe [place].\" Following standardized assessment upon asking orientation of place again she is able to state [facility].     MMSE:  The Mini Mental State Examination (MMSE) is a tool that can be used to systematically and thoroughly assess mental status with older,  community dwelling, hospitalized and institutionalized adults. It is an 11-question measure that tests five areas of cognitive function:  orientation, registration, attention and calculation, recall, and language. The maximum score is 30. A score of 23 or lower is indicative of cognitive impairment.     Severity Rating Score Assessment and Function   Questionably Significant (25-30) 26 If clinical signs of cognitive impairment are present, in depth cognitive assessment may be valuable.  May have clinically significant but mild deficits in day to day functioning.  Likely to affect only most demanding activities of daily living.   Mild (20-25)  In depth assessment may be helpful to better determine pattern and extent of deficits.  Significant effect in day to day functioning.  May " require supervision, support and assistance.   Moderate (10-20)  In depth assessment may be helpful if there are specific clinical indications.  Clear impairment in day to day functioning.  May require 24 hour supervision.   Severe (0-10)  Pt not likely to be able to participate in in-depth testing.  Marked impairment in day to day functioning.  Likely to require 24 hour supervision and assistance with ADLs.   Comments: Upon further assessment, the patient reveals she at times has difficulties with ADL's and requires assistance for most tasks at home. She has mild difficulty with recall and is unable to answer some simple safety questions regarding the home environment. Although the patient scored within the normal range, SLP feels that patient would benefit from skilled speech/cognitive interventions targeting problem solving during ADL's, home management tasks, and orientation with use of external aids. Recommend at least assistance upon discharge and setup of safety measures within that environment.       Thank you for this referral and for allowing me to participate in the care of this patient.

## 2020-08-15 NOTE — THERAPY EVALUATION
"Acute Care - Speech Language Pathology Initial Evaluation  [facility]     Patient Name: Betty Dennis  : 1940  MRN: [id number]  Today's Date: 8/15/2020               Admit Date: 2020   The patient was seen on this day for a cognitive assessment at request of MD and family due to concern with safety during activities of daily living (ADL's). The patient currently lives alone and has had several recent falls and a recent UTI. Currently admitted for fall and CT shows multi level spinal degenerative disease and possible normal pressure hydrocephalus. The patient is alert and requesting to go home upon SLP arrival. She is oriented to person and time. She states we are in [place]; however, given cues is able to state [place]. She then states we are at [facility], given cues states, \"well maybe [facility].\" Following standardized assessment upon asking orientation of place again she is able to state [facility].     MMSE:  The Mini Mental State Examination (MMSE) is a tool that can be used to systematically and thoroughly assess mental status with older,  community dwelling, hospitalized and institutionalized adults. It is an 11-question measure that tests five areas of cognitive function:  orientation, registration, attention and calculation, recall, and language. The maximum score is 30. A score of 23 or lower is indicative of cognitive impairment.     Severity Rating Score Assessment and Function   Questionably Significant (25-30) 26 If clinical signs of cognitive impairment are present, in depth cognitive assessment may be valuable.  May have clinically significant but mild deficits in day to day functioning.  Likely to affect only most demanding activities of daily living.   Mild (20-25)  In depth assessment may be helpful to better determine pattern and extent of deficits.  Significant effect in day to day functioning.  May require supervision, support and assistance.   Moderate (10-20)  In depth assessment " may be helpful if there are specific clinical indications.  Clear impairment in day to day functioning.  May require 24 hour supervision.   Severe (0-10)  Pt not likely to be able to participate in in-depth testing.  Marked impairment in day to day functioning.  Likely to require 24 hour supervision and assistance with ADLs.   Comments: Upon further assessment, the patient reveals she at times has difficulties with ADL's and requires assistance for most tasks at home. She has mild difficulty with recall and is unable to answer some simple safety questions regarding the home environment. Although the patient scored within the normal range, SLP feels that patient would benefit from skilled speech/cognitive interventions targeting problem solving during ADL's, home management tasks, and orientation with use of external aids. Recommend at least assistance upon discharge and setup of safety measures within that environment.       Thank you for this referral and for allowing me to participate in the care of this patient.   Kimmy Nascimento, MS CCC-SLP 8/15/2020 11:29    Visit Dx:    ICD-10-CM ICD-9-CM   1. Ataxia R27.0 781.3   2. Abnormal CT of the head R93.0 793.0   3. Anemia, unspecified type D64.9 285.9   4. Impaired cognition R41.89 294.9     Patient Active Problem List   Diagnosis   • Ataxia   • Falls frequently   • Primary osteoarthritis   • Abnormal CT scan   • Essential hypertension   • Normocytic anemia   • Acute pain due to trauma, fall   • Chronic pain syndrome, osteoarthritis     Past Medical History:   Diagnosis Date   • Chronic pain syndrome, secondary to OA    • Frequent falls    • Hypertension    • Hypothyroidism    • Osteoarthritis    • Pseudogout      Past Surgical History:   Procedure Laterality Date   • BREAST SURGERY     • HIP BIPOLAR REPLACEMENT      bilateral        SLP EVALUATION (last 72 hours)      SLP SLC Evaluation     Row Name 08/15/20 1024                   Communication  Assessment/Intervention    Document Type  evaluation  -MM        Subjective Information  no complaints  -MM        Patient Observations  alert;cooperative;agree to therapy  -MM        Patient/Family Observations  No family present.   -MM        Patient Effort  good  -MM        Symptoms Noted During/After Treatment  none  -MM           General Information    Patient Profile Reviewed  yes  -MM        Pertinent History Of Current Problem  Primary probelm: Ataxia, recent falls, recent UTI, CT with multi level spinal degenerative disease and possible normal pressure hydrocephalus.   -MM        Precautions/Limitations, Vision  WFL;for purposes of eval  -MM        Precautions/Limitations, Hearing  WFL;for purposes of eval  -MM        Prior Level of Function-Communication  WFL  -MM        Plans/Goals Discussed with  patient  -MM        Barriers to Rehab  none identified  -MM        Patient's Goals for Discharge  return to home  -MM        Standardized Assessment Used  MMSE  -MM           Pain Assessment    Additional Documentation  Pain Scale: FACES Pre/Post-Treatment (Group)  -MM           Pain Scale: FACES Pre/Post-Treatment    Pain: FACES Scale, Pretreatment  0-->no hurt  -MM        Pain: FACES Scale, Post-Treatment  0-->no hurt  -MM           Cognitive Assessment Intervention- SLP    Cognitive Function (Cognition)  mild impairment  -MM        Orientation Status (Cognition)  person;WFL;place;mild impairment  -MM        Memory (Cognitive)  simple;immediate;delayed;WFL  -MM        Attention (Cognitive)  sustained;WFL  -MM        Thought Organization (Cognitive)  mental manipulation;WFL  -MM        Executive Function (Cognition)  self-monitoring/correction;mild impairment  -MM        Pragmatics (Communication)  WFL  -MM        Right Hemisphere Function  safety awareness;mild impairment  -MM           SLP Clinical Impressions    SLP Diagnosis  Mild cognitive impairment   -MM        Rehab Potential/Prognosis  good  -MM         "SLC Criteria for Skilled Therapy Interventions Met  yes  -MM        Functional Impact  functional impact in ADLs;needs occasional supervision;decreased ability to respond to situations safely;difficulty completing home management task  -MM           Recommendations    Therapy Frequency (SLP SLC)  PRN  -MM        Predicted Duration Therapy Intervention (Days)  until discharge  -MM        Anticipated Dischage Disposition (SLP)  unknown Needs assist for safety and ADL's upon d/c  -MM           Communication Treatment Objective and Progress Goals (SLP)    Cognitive Linguistic Treatment Objectives  Cognitive Linguistic Treatment Objectives (Group)  -MM           Cognitive Linguistic Treatment Objectives    Orientation Selection  orientation, SLP goal 1  -MM        Problem Solving Selection  problem solving, SLP goal 1  -MM           Orientation Goal 1 (SLP)    Improve Orientation Through Goal 1 (SLP)  demonstrating orientation to place;demonstrating orientation to disease/impairment;use environmental aids to assist with orientation;independently (over 90% accuracy)  -MM        Time Frame (Orientation Goal 1, SLP)  by discharge  -MM        Barriers (Orientation Goal 1, SLP)  none   -MM        Progress/Outcomes (Orientation Goal 1, SLP)  goal ongoing  -MM           Functional Problem Solving Skills Goal 1 (SLP)    Improve Problem Solving Through Goal 1 (SLP)  answer \"what if\" questions;complete organization/home management task;determine solutions to complex problems;determine multiple solutions to problems;independently (over 90% accuracy)  -MM        Time Frame (Problem Solving Goal 1, SLP)  by discharge  -MM        Barriers (Problem Solving Goal 1, SLP)  none   -MM        Progress/Outcomes (Problem Solving Goal 1, SLP)  goal ongoing  -MM          User Key  (r) = Recorded By, (t) = Taken By, (c) = Cosigned By    Initials Name Effective Dates    MM Kimmy Nascimento MS CCC-SLP 07/12/20 -              EDUCATION  The " "patient has been educated in the following areas:     Cognitive Impairment.    SLP Recommendation and Plan  SLP Diagnosis: Mild cognitive impairment      SLC Criteria for Skilled Therapy Interventions Met: yes  Anticipated Dischage Disposition (SLP): unknown(Needs assist for safety and ADL's upon d/c)     Predicted Duration Therapy Intervention (Days): until discharge    Plan of Care Reviewed With: patient, other (see comments)(BEATRICE Choudhury)  Progress: no change(Initial Evaluation)      SLP GOALS     Row Name 08/15/20 1024             Orientation Goal 1 (SLP)    Improve Orientation Through Goal 1 (SLP)  demonstrating orientation to place;demonstrating orientation to disease/impairment;use environmental aids to assist with orientation;independently (over 90% accuracy)  -MM      Time Frame (Orientation Goal 1, SLP)  by discharge  -MM      Barriers (Orientation Goal 1, SLP)  none   -MM      Progress/Outcomes (Orientation Goal 1, SLP)  goal ongoing  -MM         Functional Problem Solving Skills Goal 1 (SLP)    Improve Problem Solving Through Goal 1 (SLP)  answer \"what if\" questions;complete organization/home management task;determine solutions to complex problems;determine multiple solutions to problems;independently (over 90% accuracy)  -MM      Time Frame (Problem Solving Goal 1, SLP)  by discharge  -MM      Barriers (Problem Solving Goal 1, SLP)  none   -MM      Progress/Outcomes (Problem Solving Goal 1, SLP)  goal ongoing  -MM        User Key  (r) = Recorded By, (t) = Taken By, (c) = Cosigned By    Initials Name Provider Type    Kimmy Hernandez MS CCC-SLP Speech and Language Pathologist                  Time Calculation:     Time Calculation- SLP     Row Name 08/15/20 1128             Time Calculation- SLP    SLP Start Time  1020  -MM      SLP Stop Time  1128  -MM      SLP Time Calculation (min)  68 min  -MM      SLP Received On  08/15/20  -MM      SLP Goal Re-Cert Due Date  08/25/20  -MM        User Key  (r) = " Recorded By, (t) = Taken By, (c) = Cosigned By    Initials Name Provider Type     Kimmy Nascimento, MS CCC-SLP Speech and Language Pathologist          Therapy Charges for Today     Code Description Service Date Service Provider Modifiers Qty    [id number]  ST EVAL SPEECH AND PROD W LANG  5 8/15/2020 Kimmy Nascimento, MS CCC-SLP GN 1                     Kimmy Nascimento MS CCC-SLP  8/15/2020

## 2020-08-15 NOTE — PLAN OF CARE
Problem: Patient Care Overview  Goal: Plan of Care Review  Flowsheets (Taken 8/15/2020 1312)  Outcome Summary: PT joanna completed. She presents alert and oriented x 4. She needed mod assist x 1 to get to the EOB and min assist x 2 to stand and ambulate. She was able to ambulate ~ 20-25 ft with a RW. She experienced 1 significant LOB to the right needing mod assist x 2 to maintain dynamic balance. She continues to be a high risk for falls and demos decreased safety awareness. PT will cont with gait, balance, and strength. She will need SNF placement for continued therapy.

## 2020-08-15 NOTE — PLAN OF CARE
Patient is alert and oriented x 4. DUONG. Denies any numbness. Noted that patient had difficult walking [place] bed. VSS. Tele: NSR. Sleeping through night. Her pain had been minimal. Incontinent at times. Needs occult blood stool to be collected. MRI today. SCD's on.     Problem: Pain, Chronic (Adult)  Goal: Identify Related Risk Factors and Signs and Symptoms  Description  Related risk factors and signs and symptoms are identified upon initiation of Human Response Clinical Practice Guideline (CPG).  Outcome: Ongoing (interventions implemented as appropriate)  Flowsheets (Taken 8/15/2020 0514)  Related Risk Factors (Chronic Pain): physical disability; disease process  Signs and Symptoms (Chronic Pain): fatigue/weakness     Problem: Pain, Chronic (Adult)  Goal: Acceptable Pain/Comfort Level and Functional Ability  Description  Patient will demonstrate the desired outcomes by discharge/transition of care.  Outcome: Ongoing (interventions implemented as appropriate)  Flowsheets (Taken 8/15/2020 0514)  Acceptable Pain/Comfort Level and Functional Ability: making progress toward outcome     Problem: Patient Care Overview  Goal: Plan of Care Review  Outcome: Ongoing (interventions implemented as appropriate)  Flowsheets  Taken 8/15/2020 0514  Progress: no change  Taken 8/14/2020 2045  Plan of Care Reviewed With: patient     Problem: Patient Care Overview  Goal: Individualization and Mutuality  Outcome: Ongoing (interventions implemented as appropriate)  Flowsheets (Taken 8/15/2020 0514)  Patient Specific Goals (Include Timeframe): Patient will not have any falls or injuries during this hospitalization.  Patient Specific Interventions: Need occult blood stool. MRI today.     Problem: Fall Risk (Adult)  Goal: Identify Related Risk Factors and Signs and Symptoms  Description  Related risk factors and signs and symptoms are identified upon initiation of Human Response Clinical Practice Guideline (CPG).  Outcome:  Ongoing (interventions implemented as appropriate)  Flowsheets (Taken 8/15/2020 0514)  Related Risk Factors (Fall Risk): age-related changes; fear of falling; gait/mobility problems; history of falls; slippery/uneven surfaces; environment unfamiliar  Signs and Symptoms (Fall Risk): presence of risk factors     Problem: Fall Risk (Adult)  Goal: Absence of Fall  Description  Patient will demonstrate the desired outcomes by discharge/transition of care.  Outcome: Ongoing (interventions implemented as appropriate)  Flowsheets (Taken 8/15/2020 0514)  Absence of Fall: making progress toward outcome     Problem: Activity Intolerance (Adult)  Goal: Identify Related Risk Factors and Signs and Symptoms  Description  Related risk factors and signs and symptoms are identified upon initiation of Human Response Clinical Practice Guideline (CPG).  Outcome: Ongoing (interventions implemented as appropriate)  Flowsheets (Taken 8/15/2020 0514)  Related Risk Factors (Activity Intolerance): generalized weakness  Signs and Symptoms (Activity Intolerance): pain/discomfort     Problem: Activity Intolerance (Adult)  Goal: Activity Tolerance  Description  Patient will demonstrate the desired outcomes by discharge/transition of care.  Outcome: Ongoing (interventions implemented as appropriate)  Flowsheets (Taken 8/15/2020 0514)  Activity Tolerance: making progress toward outcome     Problem: Activity Intolerance (Adult)  Goal: Effective Energy Conservation Techniques  Description  Patient will demonstrate the desired outcomes by discharge/transition of care.  Outcome: Ongoing (interventions implemented as appropriate)  Flowsheets (Taken 8/15/2020 0514)  Effective Energy Conservation Techniques: making progress toward outcome

## 2020-08-15 NOTE — THERAPY EVALUATION
Acute Care - Occupational Therapy Initial Evaluation  [facility]     Patient Name: Betty Dennis  : 1940  MRN: [id number]  Today's Date: 8/15/2020  Onset of Illness/Injury or Date of Surgery: 20  Date of Referral to OT: 20  Referring Physician: Dr. Patten    Admit Date: 2020       ICD-10-CM ICD-9-CM   1. Ataxia R27.0 781.3   2. Abnormal CT of the head R93.0 793.0   3. Anemia, unspecified type D64.9 285.9   4. Impaired cognition R41.89 294.9   5. Decreased activities of daily living (ADL) Z78.9 V49.89     Patient Active Problem List   Diagnosis   • Ataxia   • Falls frequently   • Primary osteoarthritis   • Abnormal CT scan   • Essential hypertension   • Normocytic anemia   • Acute pain due to trauma, fall   • Chronic pain syndrome, osteoarthritis     Past Medical History:   Diagnosis Date   • Chronic pain syndrome, secondary to OA    • Frequent falls    • Hypertension    • Hypothyroidism    • Osteoarthritis    • Pseudogout      Past Surgical History:   Procedure Laterality Date   • BREAST SURGERY     • HIP BIPOLAR REPLACEMENT      bilateral          OT ASSESSMENT FLOWSHEET (last 12 hours)      Occupational Therapy Evaluation     Row Name 08/15/20 1420                   OT Evaluation Time/Intention    Subjective Information  complains of  -JJ        Document Type  evaluation see MAR  -JJ        Mode of Treatment  occupational therapy  -J           General Information    Patient Profile Reviewed?  yes  -JJ        Onset of Illness/Injury or Date of Surgery  20  -JJ        Referring Physician  Dr. Patten  -JJ        Patient Observations  alert;cooperative;agree to therapy  -JJ        Patient/Family Observations  no family present in room   -JJ        General Observations of Patient  fowlers in bed, IV infusing, alert   -JJ        Prior Level of Function  min assist:;ADL's;independent:;transfer;bed mobility;all household mobility  -JJ        Equipment Currently Used at Home  walker,  rolling  -JJ        Pertinent History of Current Functional Problem  Pt presented to ED with generalized pain s/p fall at home. Pt has a hx of severe arthritis, chronic pain syndrome from OA, reports of multiple falls recently. Dx: ataxia and freqeunt falls.   -JJ        Existing Precautions/Restrictions  fall  -JJ        Risks Reviewed  patient:;LOB;nausea/vomiting;increased discomfort;dizziness;change in vital signs;lines disloged;increased drainage  -JJ        Benefits Reviewed  patient:;improve function;increase independence;increase strength;increase balance;decrease pain;decrease risk of DVT;improve skin integrity;increase knowledge  -JJ           Relationship/Environment    Lives With  alone  -JJ           Resource/Environmental Concerns    Current Living Arrangements  home/apartment/condo  -JJ           Cognitive Assessment/Interventions    Additional Documentation  Cognitive Assessment/Intervention (Group)  -JJ           Cognitive Assessment/Intervention- PT/OT    Affect/Mental Status (Cognitive)  WFL  -JJ        Orientation Status (Cognition)  oriented x 4  -JJ        Personal Safety Interventions  fall prevention program maintained;gait belt;muscle strengthening facilitated;nonskid shoes/slippers when out of bed;supervised activity  -JJ           Safety Issues, Functional Mobility    Safety Issues Affecting Function (Mobility)  awareness of need for assistance;insight into deficits/self awareness;safety precaution awareness;safety precautions follow-through/compliance;at risk behavior observed  -JJ        Impairments Affecting Function (Mobility)  balance;endurance/activity tolerance;strength;pain  -JJ           Bed Mobility Assessment/Treatment    Bed Mobility Assessment/Treatment  supine-sit  -JJ        Supine-Sit [place] (Bed Mobility)  moderate assist (50% patient effort);verbal cues  -JJ        Assistive Device (Bed Mobility)  head of bed elevated;bed rails;draw sheet  -JJ           Functional  Mobility    Functional Mobility- Ind. Level  verbal cues required;minimum assist (75% patient effort);2 person assist required  -        Functional Mobility- Device  rolling walker  -        Functional Mobility- Safety Issues  sequencing ability decreased  -        Functional Mobility- Comment  around bed to chair.   -           Transfer Assessment/Treatment    Transfer Assessment/Treatment  sit-stand transfer;stand-sit transfer  -           Sit-Stand Transfer    Sit-Stand [place] (Transfers)  minimum assist (75% patient effort);moderate assist (50% patient effort);2 person assist;verbal cues  -        Assistive Device (Sit-Stand Transfers)  walker, front-wheeled  -           Stand-Sit Transfer    Stand-Sit [place] (Transfers)  minimum assist (75% patient effort);moderate assist (50% patient effort);2 person assist;verbal cues  -        Assistive Device (Stand-Sit Transfers)  walker, front-wheeled  -           ADL Assessment/Intervention    BADL Assessment/Intervention  lower body dressing  -           Lower Body Dressing Assessment/Training    Lower Body Dressing [place] Level  don;socks;independent  -        Lower Body Dressing Position  edge of bed sitting  -           BAD Safety/Performance    Impairments, [facility] Safety/Performance  balance;endurance/activity tolerance;strength;pain  -        Skilled BADL Treatment/Intervention  [facility] process/adaptation training  -           General ROM    GENERAL ROM COMMENTS  B UE AROM WFL   -           MMT (Manual Muscle Testing)    General MMT Comments  B UE strength 4-/5  -           Motor Assessment/Interventions    Additional Documentation  Balance (Group)  -           Balance    Balance  static sitting balance;static standing balance  -           Static Sitting Balance    Level of [place] (Unsupported Sitting, Static Balance)  contact guard assist  -        Sitting Position (Unsupported Sitting, Static Balance)   sitting on edge of bed  -JJ           Static Standing Balance    Level of [place] (Supported Standing, Static Balance)  moderate assist, 50 to 74% patient effort;2 person assist  -JJ        Comment (Supported Standing, Static Balance)  R lateral LOB  -JJ           Sensory Assessment/Intervention    Sensory General Assessment  no sensation deficits identified  -JJ           Positioning and Restraints    Pre-Treatment Position  in bed  -JJ        Post Treatment Position  chair  -JJ        In Chair  reclined;notified nsg;call light within reach;encouraged to call for assist  -JJ           Pain Assessment    Additional Documentation  Pain Scale: Numbers Pre/Post-Treatment (Group)  -JJ           Pain Scale: Numbers Pre/Post-Treatment    Pain Scale: Numbers, Pretreatment  5/10  -JJ        Pain Location - Orientation  generalized  -JJ        Pain Intervention(s)  Repositioned;Ambulation/increased activity;Medication (See MAR)  -JJ           Plan of Care Review    Plan of Care Reviewed With  patient  -JJ        Outcome Summary  OT eval completed. Pt is A&Ox4. Demo's decreased strength, balance, activity tolerance, and increased pain. Pt required Mod A for supine to sit at EOB. Max A for donning socks. Mod A x2 for sit <> stand t/f from EOB with rwx. Min Ax2 for all functional mobility with rwx. Pt had one lateral LOB during mobility requiring Mod Ax2 to correct. Pt would benefit from skilled OT services to address these deficits. Pt is at a high risk for falls with decreased safety awareness. Recommend d/c to SNF.   -J           Clinical Impression (OT)    Date of Referral to OT  08/14/20  -J        OT Diagnosis  decreased adls  -JJ        Prognosis (OT Eval)  good  -JJ        Patient/Family Goals Statement (OT Eval)  return home  -J        Criteria for Skilled Therapeutic Interventions Met (OT Eval)  yes;treatment indicated  -        Rehab Potential (OT Eval)  good, to achieve stated therapy goals  -J         Therapy Frequency (OT Eval)  5 times/wk  -JJ        Predicted Duration of Therapy Intervention (Therapy Eval)  10 days  -JJ        Care Plan Review (OT)  evaluation/treatment results reviewed;care plan/treatment goals reviewed;risks/benefits reviewed;current/potential barriers reviewed;patient/other agree to care plan  -JJ        Anticipated Discharge Disposition (OT)  [facility] nursing facility  -JJ           Planned OT Interventions    Planned Therapy Interventions (OT Eval)  activity tolerance training;BADL retraining;adaptive equipment training;functional balance retraining;occupation/activity based interventions;patient/caregiver education/training;strengthening exercise;transfer/mobility retraining  -JJ           OT Goals    Transfer Goal Selection (OT)  transfer, OT goal 1  -JJ        Dressing Goal Selection (OT)  dressing, OT goal 1  -JJ        Toileting Goal Selection (OT)  toileting, OT goal 1  -JJ        Balance Goal Selection (OT)  balance, OT goal 1  -JJ        Additional Documentation  Balance Goal Selection (OT) (Row)  -JJ           Transfer Goal 1 (OT)    Activity/Assistive Device (Transfer Goal 1, OT)  transfers, all  -JJ        [place] Level/Cues Needed (Transfer Goal 1, OT)  contact guard assist  -JJ        Time Frame (Transfer Goal 1, OT)  long term goal (LTG);by discharge  -JJ        Progress/Outcome (Transfer Goal 1, OT)  goal ongoing  -JJ           Dressing Goal 1 (OT)    Activity/Assistive Device (Dressing Goal 1, OT)  dressing skills, all AE PRN  -JJ        [place]/Cues Needed (Dressing Goal 1, OT)  moderate assist (50-74% patient effort)  -JJ        Time Frame (Dressing Goal 1, OT)  long term goal (LTG);by discharge  -JJ        Progress/Outcome (Dressing Goal 1, OT)  goal ongoing  -JJ           Toileting Goal 1 (OT)    Activity/Device (Toileting Goal 1, OT)  toileting skills, all;commode  -JJ        [place] Level/Cues Needed (Toileting Goal 1, OT)  minimum assist (75% or more  patient effort)  -J        Time Frame (Toileting Goal 1, OT)  long term goal (LTG);by discharge  -JJ        Progress/Outcome (Toileting Goal 1, OT)  goal ongoing  -           Balance Goal 1 (OT)    Activity/Assistive Device (Balance Goal 1, OT)  sitting, static;sitting, dynamic;standing, static;standing, dynamic  -J        [place] Level/Cues Needed (Balance Goal 1, OT)  contact guard assist  -JJ        Time Frame (Balance Goal 1, OT)  long term goal (LTG);by discharge  -JJ        Progress/Outcomes (Balance Goal 1, OT)  goal ongoing  -           Living Environment    Home Accessibility  -- walk-in shower  -          User Key  (r) = Recorded By, (t) = Taken By, (c) = Cosigned By    Initials Name Effective Dates    Brittny Roberts, OTR/L 07/05/20 -          Occupational Therapy Education                 Title: PT OT SLP Therapies (In Progress)     Topic: Occupational Therapy (In Progress)     Point: ADL training (Done)     Description:   Instruct learner(s) on proper safety adaptation and remediation techniques during self care or transfers.   Instruct in proper use of assistive devices.              Learning Progress Summary           Patient Acceptance, E, VU by WILVER at 8/15/2020 1517                   Point: Home exercise program (Not Started)     Description:   Instruct learner(s) on appropriate technique for monitoring, assisting and/or progressing therapeutic exercises/activities.              Learner Progress:   Not documented in this visit.          Point: Precautions (Done)     Description:   Instruct learner(s) on prescribed precautions during self-care and functional transfers.              Learning Progress Summary           Patient Acceptance, E, VU by WILVER at 8/15/2020 1517                   Point: Body mechanics (Done)     Description:   Instruct learner(s) on proper positioning and spine alignment during self-care, functional mobility activities and/or exercises.              Learning  Progress Summary           Patient Acceptance, E, VU by WILVER at 8/15/2020 1517                               User Key     Initials Effective Dates Name Provider Type Discipline     07/05/20 -  Brittny Guardado OTR/L Occupational Therapist OT                  OT Recommendation and Plan  Outcome Summary/Treatment Plan (OT)  Anticipated Discharge Disposition (OT): skilled nursing facility  Planned Therapy Interventions (OT Eval): activity tolerance training, BADL retraining, adaptive equipment training, functional balance retraining, occupation/activity based interventions, patient/caregiver education/training, strengthening exercise, transfer/mobility retraining  Therapy Frequency (OT Eval): 5 times/wk  Plan of Care Review  Plan of Care Reviewed With: patient  Plan of Care Reviewed With: patient  Outcome Summary: OT eval completed. Pt is A&Ox4. Demo's decreased strength, balance, activity tolerance, and increased pain. Pt required Mod A for supine to sit at EOB. Max A for donning socks. Mod A x2 for sit <> stand t/f from EOB with rwx. Min Ax2 for all functional mobility with rwx. Pt had one lateral LOB during mobility requiring Mod Ax2 to correct. Pt would benefit from skilled OT services to address these deficits. Pt is at a high risk for falls with decreased safety awareness. Recommend d/c to SNF.     Outcome Measures     Row Name 08/15/20 1500             How much help from another is currently needed...    Putting on and taking off regular lower body clothing?  1  -JJ      Bathing (including washing, rinsing, and drying)  2  -JJ      Toileting (which includes using toilet bed pan or urinal)  2  -JJ      Putting on and taking off regular upper body clothing  3  -JJ      Taking care of personal grooming (such as brushing teeth)  4  -JJ      Eating meals  4  -JJ      AM-PAC 6 Clicks Score (OT)  16  -JJ         Functional Assessment    Outcome Measure Options  AM-PAC 6 Clicks Daily Activity (OT)  -JJ        User  Key  (r) = Recorded By, (t) = Taken By, (c) = Cosigned By    Initials Name Provider Type    Brittny Roberts OTR/L Occupational Therapist          Time Calculation:   Time Calculation- OT     Row Name 08/15/20 1517             Time Calculation- OT    OT Start Time  1420  -      OT Stop Time  1500  -      OT Time Calculation (min)  40 min  -      OT Received On  08/15/20  -      OT Goal Re-Cert Due Date  08/25/20  -        User Key  (r) = Recorded By, (t) = Taken By, (c) = Cosigned By    Initials Name Provider Type    Brittny Roberts OTR/L Occupational Therapist        Therapy Charges for Today     Code Description Service Date Service Provider Modifiers Qty    03228924197 HC OT EVAL LOW COMPLEXITY 3 8/15/2020 Brittny Guardado OTR/L GO 1               DRE Vila/BUNNY  8/15/2020

## 2020-08-15 NOTE — PLAN OF CARE
Problem: Patient Care Overview  Goal: Plan of Care Review  Flowsheets  Taken 8/15/2020 1518  Plan of Care Reviewed With: patient  Taken 8/15/2020 1420  Outcome Summary: OT eval completed. Pt is A&Ox4. Demo's decreased strength, balance, activity tolerance, and increased pain. Pt required Mod A for supine to sit at EOB. Max A for donning socks. Mod A x2 for sit <> stand t/f from EOB with rwx. Min Ax2 for all functional mobility with rwx. Pt had one lateral LOB during mobility requiring Mod Ax2 to correct. Pt would benefit from skilled OT services to address these deficits. Pt is at a high risk for falls with decreased safety awareness. Recommend d/c to SNF.

## 2020-08-15 NOTE — PROGRESS NOTES
Discharge Planning Assessment  [facility]     Patient Name: Betty Dennis  MRN: [id number]  Today's Date: 8/15/2020    Admit Date: 8/14/2020    Discharge Needs Assessment     Row Name 08/15/20 1502       Living Environment    Lives With  alone    Current Living Arrangements  home/apartment/condo    Primary Care Provided by  self    Provides Primary Care For  no one    Family Caregiver if Needed  other relative(s)    Quality of Family Relationships  helpful;supportive       Transition Planning    Patient/Family Anticipates Transition to  home    Patient/Family Anticipated Services at Transition  skilled nursing       Discharge Needs Assessment    Readmission Within the Last 30 Days  no previous admission in last 30 days    Concerns to be Addressed  adjustment to diagnosis/illness;care coordination/care conferences;discharge planning    Equipment Currently Used at Home  walker, standard    Outpatient/Agency/Support Group Needs  skilled nursing facility    Discharge Facility/Level of Care Needs  nursing facility, skilled    Provided Post Acute Provider List?  Yes    Post Acute Provider List  Nursing Home    Delivered To  Patient    Method of Delivery  In person    Patient's Choice of Community Agency(s)  [facility]    Discharge Coordination/Progress  Pt lives at home alone. At first she said she would just go home because she has home health. She then asked what rehab facilities are in the area. Gave her the options and she requests a referral to [facility]. Referral being made. She wants to talk to her nieces to discuss further. Will follow.         Discharge Plan    No documentation.       Destination      Service Provider Request Status Selected Services Address Phone Number Fax Number    [facility] Pending - Request Sent N/A [address] [address] [phone]      Durable Medical Equipment      Coordination has not been started for this encounter.      Dialysis/Infusion       Coordination has not been started for this encounter.      Home Medical Care      Coordination has not been started for this encounter.      Therapy      Coordination has not been started for this encounter.      Community Resources      Coordination has not been started for this encounter.          Demographic Summary    No documentation.       Functional Status    No documentation.       Psychosocial    No documentation.       Abuse/Neglect    No documentation.       Legal    No documentation.       Substance Abuse    No documentation.       Patient Forms    No documentation.           MARQUISE Reyes

## 2020-08-16 LAB
ALBUMIN SERPL-MCNC: 3.7 G/DL (ref 3.5–5.2)
ALBUMIN/GLOB SERPL: 1.7 G/DL
ALP SERPL-CCNC: 69 U/L (ref 39–117)
ALT SERPL W P-5'-P-CCNC: 8 U/L (ref 1–33)
ANION GAP SERPL CALCULATED.3IONS-SCNC: 8 MMOL/L (ref 5–15)
AST SERPL-CCNC: 11 U/L (ref 1–32)
BILIRUB SERPL-MCNC: 0.2 MG/DL (ref 0–1.2)
BUN SERPL-MCNC: 21 MG/DL (ref 8–23)
BUN/CREAT SERPL: 38.9 (ref 7–25)
CALCIUM SPEC-SCNC: 9.3 MG/DL (ref 8.6–10.5)
CHLORIDE SERPL-SCNC: 103 MMOL/L (ref 98–107)
CO2 SERPL-SCNC: 28 MMOL/L (ref 22–29)
CREAT SERPL-MCNC: 0.54 MG/DL (ref 0.57–1)
DEPRECATED RDW RBC AUTO: 49.6 FL (ref 37–54)
ERYTHROCYTE [DISTWIDTH] IN BLOOD BY AUTOMATED COUNT: 15.8 % (ref 12.3–15.4)
GFR SERPL CREATININE-BSD FRML MDRD: 109 ML/MIN/1.73
GLOBULIN UR ELPH-MCNC: 2.2 GM/DL
GLUCOSE SERPL-MCNC: 96 MG/DL (ref 65–99)
HCT VFR BLD AUTO: 27.3 % (ref 34–46.6)
HGB BLD-MCNC: 8.4 G/DL (ref 12–15.9)
INR PPP: 1.08 (ref 0.91–1.09)
MCH RBC QN AUTO: 26.8 PG (ref 26.6–33)
MCHC RBC AUTO-ENTMCNC: 30.8 G/DL (ref 31.5–35.7)
MCV RBC AUTO: 87.2 FL (ref 79–97)
PLATELET # BLD AUTO: 365 10*3/MM3 (ref 140–450)
PMV BLD AUTO: 9.5 FL (ref 6–12)
POTASSIUM SERPL-SCNC: 3.7 MMOL/L (ref 3.5–5.2)
PROT SERPL-MCNC: 5.9 G/DL (ref 6–8.5)
PROTHROMBIN TIME: 13.6 SECONDS (ref 11.9–14.6)
RBC # BLD AUTO: 3.13 10*6/MM3 (ref 3.77–5.28)
SODIUM SERPL-SCNC: 139 MMOL/L (ref 136–145)
WBC # BLD AUTO: 9.28 10*3/MM3 (ref 3.4–10.8)

## 2020-08-16 PROCEDURE — 97530 THERAPEUTIC ACTIVITIES: CPT

## 2020-08-16 PROCEDURE — 80053 COMPREHEN METABOLIC PANEL: CPT | Performed by: INTERNAL MEDICINE

## 2020-08-16 PROCEDURE — 85610 PROTHROMBIN TIME: CPT | Performed by: INTERNAL MEDICINE

## 2020-08-16 PROCEDURE — 25010000002 ENOXAPARIN PER 10 MG: Performed by: INTERNAL MEDICINE

## 2020-08-16 PROCEDURE — 85027 COMPLETE CBC AUTOMATED: CPT | Performed by: INTERNAL MEDICINE

## 2020-08-16 PROCEDURE — 97535 SELF CARE MNGMENT TRAINING: CPT

## 2020-08-16 PROCEDURE — 97116 GAIT TRAINING THERAPY: CPT

## 2020-08-16 RX ORDER — LOSARTAN POTASSIUM 50 MG/1
25 TABLET ORAL
Status: DISCONTINUED | OUTPATIENT
Start: 2020-08-16 | End: 2020-08-17 | Stop reason: HOSPADM

## 2020-08-16 RX ADMIN — DOCUSATE SODIUM 50 MG AND SENNOSIDES 8.6 MG 1 TABLET: 8.6; 5 TABLET, FILM COATED ORAL at 20:14

## 2020-08-16 RX ADMIN — FOLIC ACID 1 MG: 1 TABLET ORAL at 08:07

## 2020-08-16 RX ADMIN — SODIUM CHLORIDE, PRESERVATIVE FREE 10 ML: 5 INJECTION INTRAVENOUS at 20:14

## 2020-08-16 RX ADMIN — LOSARTAN POTASSIUM 25 MG: 50 TABLET, FILM COATED ORAL at 16:48

## 2020-08-16 RX ADMIN — LABETALOL HYDROCHLORIDE 100 MG: 200 TABLET, FILM COATED ORAL at 08:07

## 2020-08-16 RX ADMIN — DULOXETINE HYDROCHLORIDE 60 MG: 30 CAPSULE, DELAYED RELEASE ORAL at 08:07

## 2020-08-16 RX ADMIN — HYDROCODONE BITARTRATE AND ACETAMINOPHEN 1 TABLET: 10; 325 TABLET ORAL at 19:07

## 2020-08-16 RX ADMIN — HYDROCODONE BITARTRATE AND ACETAMINOPHEN 1 TABLET: 10; 325 TABLET ORAL at 12:17

## 2020-08-16 RX ADMIN — ENOXAPARIN SODIUM 30 MG: 30 INJECTION SUBCUTANEOUS at 16:48

## 2020-08-16 RX ADMIN — LEVOTHYROXINE SODIUM 88 MCG: 88 TABLET ORAL at 06:42

## 2020-08-16 RX ADMIN — HYDROCODONE BITARTRATE AND ACETAMINOPHEN 1 TABLET: 10; 325 TABLET ORAL at 06:44

## 2020-08-16 NOTE — PLAN OF CARE
Problem: Patient Care Overview  Goal: Plan of Care Review  Outcome: Ongoing (interventions implemented as appropriate)  Flowsheets (Taken 8/16/2020 1513)  Progress: improving  Plan of Care Reviewed With: patient  Outcome Summary: Pt A&Ox4, VSS safety maintained. Incontinent, will call for bsc. C/o pain with relief from po prn pain medication. Small amounts of activity encouraged with rest breaks in between. Will continue to monitor

## 2020-08-16 NOTE — PLAN OF CARE
Problem: Patient Care Overview  Goal: Plan of Care Review  Flowsheets (Taken 8/16/2020 1103)  Progress: improving  Plan of Care Reviewed With: patient  Outcome Summary: Bed mobility mod x1. Pt stood min x1 w/ posterior lean took few minutes and cues to get pt balance before attempt amb. Pt was able to amb 20' rwx cga/min help w/ AD at times and cues for safety. pt w/ some ataxia and increased tone. pt would benfit from cont PT snf upon d/c

## 2020-08-16 NOTE — PLAN OF CARE
Patient is alert and oriented x 4. Medicated once for c/o pain, with prn po pain med, with relief noted. Sleeping through the night. VSS. SCD's on. Still needing occult blood. Safety maintained.

## 2020-08-16 NOTE — PROGRESS NOTES
"    [facility] Medicine Services  INPATIENT PROGRESS NOTE    Length of Stay: 2  Date of Admission: 8/14/2020  Primary Care Physician: Hardik Taveras DO    Subjective   Chief Complaint: Follow-up  HPI   Patient seen lying in the bed.  No oxygen use.  No family in room.  Reports she worked with physical therapy but \"did not do too good\".  Reports her balance is not good and she needs to go to skilled facility.  She denies nausea, vomiting or abdominal pain.  Denies chest pain, palpitations, or shortness of breath.  She hopes to go to [place] [facility] [place].  Complains of weakness and unable to take care of herself alone.    Review of Systems     All pertinent negatives and positives are as above. All other systems have been reviewed and are negative unless otherwise stated.     Objective    Temp:  [97.9 °F (36.6 °C)-98.5 °F (36.9 °C)] 97.9 °F (36.6 °C)  Heart Rate:  [] 101  Resp:  [16] 16  BP: (126-150)/(46-88) 144/78  Physical Exam   Constitutional: She is oriented to person, place, and time.   No distress.  Lying in bed.  No oxygen use.  No family in room.   HENT:   Head: Normocephalic and atraumatic.   Eyes: Pupils are equal, round, and reactive to light. EOM are normal.   Neck: Normal range of motion. Neck supple.   Cardiovascular: Normal rate and regular rhythm. Exam reveals no gallop and no friction rub.   Murmur heard.  Normal sinus rhythm 82 on telemetry   Pulmonary/Chest: Effort normal and breath sounds normal. She has no wheezes. She has no rales.   No oxygen in use.  Lungs clear.   Abdominal: Soft. Bowel sounds are normal. She exhibits no distension. There is no tenderness.   Genitourinary:   Genitourinary Comments: Voiding.   Musculoskeletal: Normal range of motion. She exhibits no edema, tenderness or deformity.   Neurological: She is alert and oriented to person, place, and time.   Follows commands.  No word finding issues.  Denies blurred vision double " vision.   Skin: Skin is warm and dry. No rash noted. No erythema. No pallor.   Psychiatric: She has a normal mood and affect. Her behavior is normal. Judgment and thought content normal.     Results Review:  I have reviewed the labs, radiology results, and diagnostic studies.    Laboratory Data:   Results from last 7 days   Lab Units 08/16/20  0552 08/15/20  0507 08/14/20  1549   WBC 10*3/mm3 9.28 9.99 9.02   HEMOGLOBIN g/dL 8.4* 8.3* 8.8*   HEMATOCRIT % 27.3* 26.4* 28.2*   PLATELETS 10*3/mm3 365 384 413        Results from last 7 days   Lab Units 08/16/20  0552 08/15/20  0507 08/14/20  1549   SODIUM mmol/L 139 141 140   POTASSIUM mmol/L 3.7 3.9 4.3   CHLORIDE mmol/L 103 102 98   CO2 mmol/L 28.0 28.0 29.0   BUN mg/dL 21 21 23   CREATININE mg/dL 0.54* 0.58 0.65   CALCIUM mg/dL 9.3 9.4 9.3   BILIRUBIN mg/dL 0.2 0.2 0.2   ALK PHOS U/L 69 71 77   ALT (SGPT) U/L 8 11 9   AST (SGOT) U/L 11 25 12   GLUCOSE mg/dL 96 111* 135*     No results found for: BLOODCX, URINECX, WOUNDCX, MRSACX, RESPCX, STOOLCX  Imaging Results (All)     Procedure Component Value Units Date/Time    MRI Brain Without Contrast [[id number]] Collected:  08/15/20 1001     Updated:  08/15/20 1009    Narrative:       Exam: MRI BRAIN WO CONTRAST-     Indication: Hydrocephalus, idiopathic, norm pressure; R27.0-Ataxia,  unspecified; R93.0-Abnormal findings on diagnostic imaging of skull and  head, not elsewhere classified; D64.9-Anemia, unspecified     Comparison: CT head 8/14/2020     Findings:     No diffusion restriction to suggest acute infarction. No increased  susceptibility to suggest intracranial hemorrhage. Major physiologic  flow voids are maintained.     Patchy and confluent periventricular and subcortical T2 FLAIR  hyperintense white matter lesions likely represent sequela of chronic  microvascular ischemic change. No other significant abnormality of brain  parenchyma signal intensity. No midline shift or mass effect. Global  cerebral volume loss  with ex vacuo ventricular dilatation is again  noted. Third ventricle measures 11 mm transverse dimension. Ventricular  caliber is stable from prior day head CT. Basilar cisterns are patent.  Sella turcica and its contents appear unremarkable.     No acute orbital finding. Trace LEFT mastoid effusion. Paranasal sinuses  appear clear.       Impression:       Impression:  1.  No acute intracranial findings. No acute infarct.  2.  Chronic microvascular ischemic white matter change and global  cerebral volume loss. Presumed ex vacuo ventricular dilatation with  stable ventricular caliber compared to one day prior.     This report was finalized on 08/15/2020 10:06 by Dr. Blane Pacheco MD.    XR Ankle 3+ View Right [[id number]] Collected:  08/14/20 1856     Updated:  08/14/20 1904    Narrative:       Exam: XR ANKLE 3+ VW RIGHT-     Indication: Fall; R27.0-Ataxia, unspecified; R93.0-Abnormal findings on  diagnostic imaging of skull and head, not elsewhere classified;  D64.9-Anemia, unspecified     Comparison: None     Findings:     Ankle mortise is congruent. Talar dome is intact. No acute fracture or  dislocation. No suspicious bone lesion. The bones are demineralized. No  radiopaque foreign body or soft tissue gas.       Impression:       Impression:     No acute osseous findings.  This report was finalized on 08/14/2020 19:01 by Dr. Blane Pacheco MD.    CT Thoracic Spine Without Contrast [[id number]] Collected:  08/14/20 1705     Updated:  08/14/20 1713    Narrative:       Exam: CT THORACIC SPINE WO CONTRAST-     Indication: T/L-spine trauma, minor-mod, low back pain     Comparison: None     Dose length product: 641 mGy cm. Automated exposure control was also  utilized to decrease patient radiation dose.     Findings:     Vertebral body heights are maintained. No acute fracture or subluxation.  Mild dextrocurvature the lumbar spine is noted. Moderate multilevel  degenerative change characterized by endplate osteophytes  and facet  arthropathy. Included lungs are clear. No visible pneumothorax.       Impression:       Impression:     No acute fracture or subluxation. Mild dextrocurvature and moderate  multilevel degenerative change of the thoracic spine.  This report was finalized on 08/14/2020 17:10 by Dr. Blane Pacheco MD.    CT Lumbar Spine Without Contrast [[id number]] Collected:  08/14/20 1657     Updated:  08/14/20 1704    Narrative:       EXAMINATION:   CT LUMBAR SPINE WO CONTRAST-  8/14/2020 4:57 PM CDT     HISTORY: CT lumbar spine without contrast 8/14/2020 3:57 PM CDT     History: T/L-spine trauma, minor-mod, low back pain     Comparison: None      DLP: 544 mGy cm     Technique: Serial helical tomographic images of the lumbar spine were  obtained without the use of intravenous contrast. Additionally, sagittal  and coronal reformatted images were provided for review.      Findings:   Bilateral pars defects are present at L5. There is a 10 mm  anterolisthesis of L5 on S1. The remainder of the lumbar vertebra are  relatively aligned.. Vertebral body heights are well maintained. Vacuum  phenomenon is present at all disc spaces.. There is no bony narrowing of  the spinal canal or neuroforamina. Vascular calcifications present  abdominal aorta..      SI joints are unremarkable.        Impression:       Impression:   1. Degenerative spondylosis is noted with degenerative disc disease at  all levels. Bilateral pars defects at L5 with 10 mm anterolisthesis of  L5 on S1.        This report was finalized on 08/14/2020 17:01 by Dr. Jacob Pickard MD.    CT Cervical Spine Without Contrast [[id number]] Collected:  08/14/20 1638     Updated:  08/14/20 1651    Narrative:       Exam: CT CERVICAL SPINE WO CONTRAST-     Indication: C-spine trauma, NEXUS/CCR positive, +risk factor(s)     Comparison: None     Dose length product: 290 mGy cm. Automated exposure control was also  utilized to decrease patient radiation dose.     Findings:        Skull base relationships are maintained. Odontoid process is intact.  There is reversal of normal cervical lordosis. 3 mm anterolisthesis of  C3 on C4 and C4 on C5. Vertebral body heights are maintained. No acute  fracture. Severe multilevel degenerative change with varying degrees of  moderate to severe multilevel neural foraminal stenosis. No acute soft  tissue finding. No apical pneumothorax.       Impression:       Impression:  1.  No acute fracture.  2.  3 mm anterolisthesis of C3 on C4 and C4 on C5, likely related to  degenerative change.  3.  Reversal of normal cervical lordosis.  4.  Severe multilevel cervical spine degenerative change with multilevel  neural foraminal stenosis.     This report was finalized on 08/14/2020 16:48 by Dr. Blane Pacheco MD.    CT Head Without Contrast [[id number]] Collected:  08/14/20 1633     Updated:  08/14/20 1640    Narrative:       Exam: CT HEAD WO CONTRAST-     Indication: Head trauma, headache     Comparison: None     Dose length product: 758 mGy cm. Automated exposure control was also  utilized to decrease patient radiation dose.     Findings:     No acute intracranial hemorrhage. No loss of gray-white differentiation.  Chronic microvascular ischemic white matter change and global cerebral  volume loss is noted. Ex vacuo ventricular dilatation. Basilar cisterns  are patent. No midline shift or mass effect. Visualized portion of the  orbits appear unremarkable. The mastoid air cells and visualized  paranasal sinuses are clear. No acute osseous finding.       Impression:       Impression:     1.  No acute intracranial findings.  2.  Global cerebral volume loss and presumed chronic microvascular  ischemic white matter change. The degree of ventricular dilatation is  questionably disproportionate to the background of underlying volume  loss raising the possibility of normal pressure hydrocephalus. Please  clinically correlate.  This report was finalized on 08/14/2020 16:37  by Dr. Blane Pacheco MD.        Intake/Output    Intake/Output Summary (Last 24 hours) at 8/16/2020 1552  Last data filed at 8/16/2020 0943  Gross per 24 hour   Intake 480 ml   Output 75 ml   Net 405 ml       Scheduled Meds    DULoxetine 60 mg Oral Daily   enoxaparin 30 mg Subcutaneous Q24H   folic acid 1 mg Oral Daily   labetalol 100 mg Oral Daily   levothyroxine 88 mcg Oral Q AM   [START ON 8/17/2020] methotrexate 2.5 mg Oral Q12H - 3 Doses a Week   senna-docusate sodium 1 tablet Oral Nightly   sodium chloride 10 mL Intravenous Q12H       I have reviewed the patient current medications.     Assessment/Plan     Active Hospital Problems    Diagnosis   • **Ataxia   • Falls frequently   • Primary osteoarthritis   • Abnormal CT scan   • Essential hypertension   • Normocytic anemia   • Acute pain due to trauma, fall   • Chronic pain syndrome, osteoarthritis     Plan:  1.  Presented after fall at home.  Reports weakness and loss of balance.  2.  MRI the brain no acute intracranial findings.  No acute infarct.  3.  CT scan of the head no acute intracranial findings.  Lobar cerebral volume loss and presumed chronic microvascular ischemic white matter changes.  4.  CT cervical spine no acute fracture.  Degenerative changes.  CT thoracic spine no acute fracture or subluxation.  CT lumbar spine degenerative spondylosis with degenerative disc disease at all levels.  5.  X-ray right ankle no acute findings.  6.  Physical therapy occupational therapy consult.  Noted loss of balance with activity.  Recommends skilled facility for physical therapy  7.  Lovenox for deep vein thrombosis prophylaxis  8.  Home medications reviewed and resumed if appropriate.  Blood pressure 144/78, 150/81.  Resume losartan 25 mg daily.  Will have pharmacy tech due to medication reconciliation with drugstore when open.    Her daughters, Constanza Bach and Elis Espana will assist with decision-making if she is unable to make decisions for herself.  The  above documentation resulted from a face-to-face encounter by me Mervat HERNÁNDEZ, [facility].    Discharge Planning: I expect patient to be discharged to SNF if bed offered and insurance approves.        Electronically signed by BETTY Bruce, 8/16/2020, 15:52.    I personally evaluated and examined the patient in conjunction with BETTY Gray and agree with the assessment, treatment plan, and disposition of the patient as recorded by her. My history, exam, and further recommendations are:     Patient seen and examined.  Patient indicated she felt as though she did okay with therapy this morning but this afternoon she indicated she had poor balance and easily fatigued.  Exam unchanged.    Electronically signed by Sawyer Patten MD, 8/16/2020, 21:32.

## 2020-08-16 NOTE — THERAPY TREATMENT NOTE
Acute Care - Occupational Therapy Treatment Note  [facility]     Patient Name: Betty Dennis  : 1940  MRN: [id number]  Today's Date: 2020  Onset of Illness/Injury or Date of Surgery: 20  Date of Referral to OT: 20  Referring Physician: Dr. Patten    Admit Date: 2020       ICD-10-CM ICD-9-CM   1. Ataxia R27.0 781.3   2. Abnormal CT of the head R93.0 793.0   3. Anemia, unspecified type D64.9 285.9   4. Impaired cognition R41.89 294.9   5. Decreased activities of daily living (ADL) Z78.9 V49.89   6. Impaired mobility Z74.09 799.89     Patient Active Problem List   Diagnosis   • Ataxia   • Falls frequently   • Primary osteoarthritis   • Abnormal CT scan   • Essential hypertension   • Normocytic anemia   • Acute pain due to trauma, fall   • Chronic pain syndrome, osteoarthritis     Past Medical History:   Diagnosis Date   • Chronic pain syndrome, secondary to OA    • Frequent falls    • Hypertension    • Hypothyroidism    • Osteoarthritis    • Pseudogout      Past Surgical History:   Procedure Laterality Date   • BREAST SURGERY     • HIP BIPOLAR REPLACEMENT      bilateral       Therapy Treatment    Rehabilitation Treatment Summary     Row Name 20 1404 20 1038          Treatment Time/Intention    Discipline  occupational therapy assistant  -MT  physical therapy assistant  -NW     Document Type  therapy note (daily note)  -MT  therapy note (daily note)  -NW     Subjective Information  complains of;fatigue  -MT  complains of;weakness;pain  -NW2     Mode of Treatment  individual therapy;occupational therapy  -MT  --     Patient/Family Observations  no family   -MT  --     Patient Effort  good  -MT  --     Existing Precautions/Restrictions  fall  -MT  fall  -NW2     Treatment Considerations/Comments  fearful of falling: significant posterior lean when standing this tx   -MT  --     Patient Response to Treatment  --  fearful of falling  -NW2     Recorded by [MT] Kadi Flynn COTA/BUNNY  08/16/20 1456 [NW] Bhavna Barba, PTA 08/16/20 1039  [NW2] Bhavna Barba, PTA 08/16/20 1103     Row Name 08/16/20 1404             Cognitive Assessment/Intervention- PT/OT    Affect/Mental Status (Cognitive)  confused at times: processing delayed  -MT      Personal Safety Interventions  fall prevention program maintained;gait belt;nonskid shoes/slippers when out of bed;supervised activity;elopement precautions initiated  -MT      Recorded by [MT] Kadi Flynn COTA/L 08/16/20 1456      Row Name 08/16/20 1404 08/16/20 1038          Safety Issues, Functional Mobility    Safety Issues Affecting Function (Mobility)  awareness of need for assistance;insight into deficits/self awareness;safety precaution awareness;problem solving  -MT  problem solving  -NW     Impairments Affecting Function (Mobility)  balance;cognition;endurance/activity tolerance;coordination;strength  -MT  strength  -NW     Recorded by [MT] Kadi Flynn COTA/L 08/16/20 1456 [NW] Bhavna Barba, PTA 08/16/20 1103     Row Name 08/16/20 1404 08/16/20 1038          Bed Mobility Assessment/Treatment    Bed Mobility Assessment/Treatment  scooting/bridging  -MT  sit-supine  -NW     Scooting/Bridging [place] (Bed Mobility)  dependent (less than 25% patient effort)  -MT  --     Supine-Sit [place] (Bed Mobility)  --  verbal cues;minimum assist (75% patient effort);moderate assist (50% patient effort)  -NW     Sit-Supine [place] (Bed Mobility)  contact guard;minimum assist (75% patient effort)  -MT  -- chair  -NW     Recorded by [MT] Kadi Flynn COTA/L 08/16/20 1456 [NW] Bhavna Barba, PTA 08/16/20 1103     Row Name 08/16/20 1404             Functional Mobility    Functional Mobility- Ind. Level  verbal cues required;moderate assist (50% patient effort)  -MT      Functional Mobility- Device  rolling walker  -MT      Functional Mobility- Safety Issues  sequencing ability decreased;loses balance backward  -MT      Functional  Mobility- Comment  chair>BSC>bed: significant posterior lean and cues to correct 2 LOB backwards with maxA to correct. Pt was unaware she was leaning back until [place]/L lightened assist for pt to feel self posterior lean/tip RW off floor  -MT      Recorded by [MT] Kadi Flynn COTA/L 08/16/20 1456      Row Name 08/16/20 1404             Transfer Assessment/Treatment    Transfer Assessment/Treatment  sit-stand transfer;stand-sit transfer;toilet transfer  -MT      Comment (Transfers)  multiple sit<>stands and cues to correct posterior lean  -MT      Recorded by [MT] Kadi Flynn COTA/L 08/16/20 1456      Row Name 08/16/20 1404 08/16/20 1038          Sit-Stand Transfer    Sit-Stand [place] (Transfers)  verbal cues;moderate assist (50% patient effort)  -MT  verbal cues;minimum assist (75% patient effort);moderate assist (50% patient effort)  -NW     Assistive Device (Sit-Stand Transfers)  walker, front-wheeled  -MT  walker, front-wheeled posterior lean took few min to get balance  -NW     Recorded by [MT] Kadi Flynn COTA/L 08/16/20 1456 [NW] Bhavna Barba, PTA 08/16/20 1103     Row Name 08/16/20 1404 08/16/20 1038          Stand-Sit Transfer    Stand-Sit [place] (Transfers)  verbal cues;minimum assist (75% patient effort)  -MT  verbal cues;contact guard  -NW     Assistive Device (Stand-Sit Transfers)  walker, front-wheeled  -MT  walker, front-wheeled  -NW     Recorded by [MT] Kadi Flynn COTA/L 08/16/20 1456 [NW] Bhavna Barba, PTA 08/16/20 1103     Row Name 08/16/20 1404             Toilet Transfer    Type (Toilet Transfer)  stand pivot/stand step  -MT      [place] Level (Toilet Transfer)  verbal cues;moderate assist (50% patient effort)  -MT      Assistive Device (Toilet Transfer)  commode, bedside without drop arms;walker, front-wheeled  -MT      Recorded by [MT] Kadi Flynn COTA/L 08/16/20 1456      Row Name 08/16/20 1038             Gait/Stairs Assessment/Training     [place] Level (Gait)  verbal cues;contact guard;minimum assist (75% patient effort)  -NW      Assistive Device (Gait)  walker, front-wheeled  -NW      Distance in Feet (Gait)  20  -NW      Pattern (Gait)  step-to  -NW      Deviations/Abnormal Patterns (Gait)  tracey decreased;stride length decreased  -NW      Bilateral Gait Deviations  forward flexed posture;heel strike decreased  -NW      Comment (Gait/Stairs)  cues for safety and help w/ AD at times pt was able to amb around bed  -NW      Recorded by [NW] Bhavna Barba PTA 08/16/20 1103      Row Name 08/16/20 1404             ADL Assessment/Intervention    BADL Assessment/Intervention  grooming;toileting  -MT      Recorded by [MT] Kadi Flynn COTA/L 08/16/20 1456      Row Name 08/16/20 1404             Grooming Assessment/Training    [place] Level (Grooming)  wash face, hands;set up  -MT      Grooming Position  supported sitting  -MT      Recorded by [MT] Kadi Flynn COTA/L 08/16/20 1456      Row Name 08/16/20 1404             Toileting Assessment/Training    [place] Level (Toileting)  adjust/manage clothing;perform perineal hygiene;set up;maximum assist (25% patient effort)  -MT      Toileting Position  supported sitting;supported standing  -MT      Recorded by [MT] Kadi Flynn COTA/L 08/16/20 1456      Row Name 08/16/20 1404             BADL Safety/Performance    Impairments, BADL Safety/Performance  balance;endurance/activity tolerance;strength;trunk/postural control  -MT      Skilled BADL Treatment/Intervention  BADL process/adaptation training  -MT      Recorded by [MT] Kadi Flynn COTA/L 08/16/20 1456      Row Name 08/16/20 1404             Static Standing Balance    Level of [place] (Supported Standing, Static Balance)  moderate assist, 50 to 74% patient effort  -MT      Assistive Device Utilized (Supported Standing, Static Balance)  walker, rolling  -MT      Comment (Supported Standing, Static Balance)   posterior lean   -MT      Recorded by [MT] Kadi Flynn COTA/L 08/16/20 1503      Row Name 08/16/20 1404 08/16/20 1038          Positioning and Restraints    Pre-Treatment Position  sitting in chair/recliner  -MT  in bed  -NW     Post Treatment Position  bed  -MT  chair  -NW     In Bed  fowlers;call light within reach;encouraged to call for assist;exit alarm on;side rails up x2;SCD pump applied  -MT  --     In Chair  --  reclined;call light within reach;encouraged to call for assist;notified nsg  -NW     Recorded by [MT] Kadi Flynn COTA/L 08/16/20 1503 [NW] Bhavna Barba, PTA 08/16/20 1103     Row Name 08/16/20 1404             Pain Assessment    Additional Documentation  Pain Scale: Numbers Pre/Post-Treatment (Group)  -MT      Recorded by [MT] Kadi Flynn COTA/L 08/16/20 1503      Row Name 08/16/20 1404 08/16/20 1038          Pain Scale: Numbers Pre/Post-Treatment    Pain Scale: Numbers, Pretreatment  4/10  -MT  5/10  -NW     Pain Scale: Numbers, Post-Treatment  4/10  -MT  --     Pain Location - Orientation  generalized  -MT  generalized  -NW     Pain Intervention(s)  Repositioned  -MT  --     Recorded by [MT] Kadi Flynn COTA/L 08/16/20 1503 [NW] Bhavna Barba, PTA 08/16/20 1103     Row Name 08/16/20 1404             Plan of Care Review    Plan of Care Reviewed With  patient  -MT      Progress  improving  -MT      Recorded by [MT] Kadi Flynn COTA/L 08/16/20 1503      Row Name 08/16/20 1404             Outcome Summary/Treatment Plan (OT)    Daily Summary of Progress (OT)  progress towards functional goals is fair  -MT      Barriers to Overall Progress (OT)  balance and safety awareness, strength   -MT      Anticipated Discharge Disposition (OT)  skilled nursing facility  -MT      Recorded by [MT] Kadi Flynn COTA/L 08/16/20 1503        User Key  (r) = Recorded By, (t) = Taken By, (c) = Cosigned By    Initials Name Effective Dates Discipline    NW Bhavna Barba, PTA 08/02/16 -   PT    Kadi Virk COTA/L 11/29/19 -  OT             Occupational Therapy Education                 Title: PT OT SLP Therapies (In Progress)     Topic: Occupational Therapy (In Progress)     Point: ADL training (Done)     Description:   Instruct learner(s) on proper safety adaptation and remediation techniques during self care or transfers.   Instruct in proper use of assistive devices.              Learning Progress Summary           Patient Acceptance, E, VU by  at 8/15/2020 1517                   Point: Home exercise program (Not Started)     Description:   Instruct learner(s) on appropriate technique for monitoring, assisting and/or progressing therapeutic exercises/activities.              Learner Progress:   Not documented in this visit.          Point: Precautions (Done)     Description:   Instruct learner(s) on prescribed precautions during self-care and functional transfers.              Learning Progress Summary           Patient Acceptance, E, VU by CHRIS at 8/15/2020 1517                   Point: Body mechanics (Done)     Description:   Instruct learner(s) on proper positioning and spine alignment during self-care, functional mobility activities and/or exercises.              Learning Progress Summary           Patient Acceptance, E, VU by  at 8/15/2020 1517                               User Key     Initials Effective Dates Name Provider Type Discipline     07/05/20 -  Brittny Guardado OTR/L Occupational Therapist OT                OT Recommendation and Plan  Outcome Summary/Treatment Plan (OT)  Daily Summary of Progress (OT): progress towards functional goals is fair  Barriers to Overall Progress (OT): balance and safety awareness, strength   Anticipated Discharge Disposition (OT): skilled nursing facility  Daily Summary of Progress (OT): progress towards functional goals is fair  Plan of Care Review  Plan of Care Reviewed With: patient  Plan of Care Reviewed With: patient  Outcome  Summary: Pt in chair: demo'd heavy posterior lean and pt unaware. Required [place]/L to lessen assist and RW to tip backward for pt to notice her posterior lean and risk of falling. Fxl mobility via RW and modA chair>BSC>bed. Stood EOB and performed postural training for upright standing x10 mins with 2 seated RBs to increase safety with t/fs and ADLs. Performed toileting maxA, washed hands seated s/u. ModA bed mobility BTB. Recommend SNF at d/c Continue OT POC  Outcome Measures     Row Name 08/16/20 1404 08/16/20 1100 08/15/20 1500       How much help from another person do you currently need...    Turning from your back to your side while in flat bed without using bedrails?  --  2  -NW  --    Moving from lying on back to sitting on the side of a flat bed without bedrails?  --  2  -NW  --    Moving to and from a bed to a chair (including a wheelchair)?  --  3  -NW  --    Standing up from a chair using your arms (e.g., wheelchair, bedside chair)?  --  3  -NW  --    Climbing 3-5 steps with a railing?  --  1  -NW  --    To walk in hospital room?  --  3  -NW  --    AM-PAC 6 Clicks Score (PT)  --  14  -NW  --       How much help from another is currently needed...    Putting on and taking off regular lower body clothing?  1  -MT  --  1  -JJ    Bathing (including washing, rinsing, and drying)  2  -MT  --  2  -JJ    Toileting (which includes using toilet bed pan or urinal)  2  -MT  --  2  -JJ    Putting on and taking off regular upper body clothing  3  -MT  --  3  -JJ    Taking care of personal grooming (such as brushing teeth)  4  -MT  --  4  -JJ    Eating meals  4  -MT  --  4  -JJ    AM-PAC 6 Clicks Score (OT)  16  -MT  --  16  -JJ       Functional Assessment    Outcome Measure Options  --  AM-PAC 6 Clicks Basic Mobility (PT)  -NW  AM-PAC 6 Clicks Daily Activity (OT)  -JJ      User Key  (r) = Recorded By, (t) = Taken By, (c) = Cosigned By    Initials Name Provider Type    NW Bhavna Barba, PTA Physical Therapy Assistant     Kadi Virk COTA/L Occupational Therapy Assistant    Brittny Roberts, OTR/L Occupational Therapist           Time Calculation:   Time Calculation- OT     Row Name 08/16/20 1404 08/16/20 1107          Time Calculation- OT    OT Start Time  1404  -MT  --     OT Stop Time  1443  -MT  --     OT Time Calculation (min)  39 min  -MT  --     Total Timed Code Minutes- OT  39 minute(s)  -MT  --     OT Received On  08/16/20  -MT  --        Timed Charges    85170 - Gait Training Minutes   --  13  -NW     13541 - OT Therapeutic Activity Minutes  10  -MT  --     85775 - OT Self Care/Mgmt Minutes  29  -MT  --       User Key  (r) = Recorded By, (t) = Taken By, (c) = Cosigned By    Initials Name Provider Type    NW Bhavna Barba, PTA Physical Therapy Assistant    MT Kadi Flynn COTA/L Occupational Therapy Assistant        Therapy Charges for Today     Code Description Service Date Service Provider Modifiers Qty    16644777274 HC OT THERAPEUTIC ACT EA 15 MIN 8/16/2020 Kadi Flynn COTA/L GO 1    56281074123 HC OT SELF CARE/MGMT/TRAIN EA 15 MIN 8/16/2020 Kadi Flynn COTA/L GO 2               RICHELLE Vo/BUNNY  8/16/2020

## 2020-08-16 NOTE — PLAN OF CARE
Problem: Patient Care Overview  Goal: Plan of Care Review  Outcome: Ongoing (interventions implemented as appropriate)  Flowsheets (Taken 8/16/2020 1404)  Outcome Summary: Pt in chair: demo'd heavy posterior lean and pt unaware. Required [place]/L to lessen assist and RW to tip backward for pt to notice her posterior lean and risk of falling: mod-maxA to correct. Fxl mobility via RW and modA chair>BSC>bed. Stood EOB and performed postural training for upright standing x10 mins with 2 seated RBs to increase safety with t/fs and ADLs. Performed toileting maxA, washed hands seated s/u. ModA bed mobility BTB. Recommend SNF at d/c Continue OT POC

## 2020-08-17 VITALS
HEIGHT: 60 IN | DIASTOLIC BLOOD PRESSURE: 59 MMHG | OXYGEN SATURATION: 96 % | HEART RATE: 95 BPM | RESPIRATION RATE: 16 BRPM | SYSTOLIC BLOOD PRESSURE: 124 MMHG | TEMPERATURE: 98.6 F | WEIGHT: 131.5 LBS | BODY MASS INDEX: 25.82 KG/M2

## 2020-08-17 PROCEDURE — 94799 UNLISTED PULMONARY SVC/PX: CPT

## 2020-08-17 PROCEDURE — 97535 SELF CARE MNGMENT TRAINING: CPT | Performed by: OCCUPATIONAL THERAPIST

## 2020-08-17 PROCEDURE — 97116 GAIT TRAINING THERAPY: CPT

## 2020-08-17 RX ORDER — LABETALOL 100 MG/1
100 TABLET, FILM COATED ORAL DAILY
Start: 2020-08-18

## 2020-08-17 RX ORDER — HYDROCODONE BITARTRATE AND ACETAMINOPHEN 10; 325 MG/1; MG/1
1 TABLET ORAL EVERY 6 HOURS PRN
Qty: 12 TABLET | Refills: 0 | Status: SHIPPED | OUTPATIENT
Start: 2020-08-17

## 2020-08-17 RX ORDER — AMOXICILLIN 250 MG
1 CAPSULE ORAL NIGHTLY
Start: 2020-08-17

## 2020-08-17 RX ORDER — ACETAMINOPHEN 325 MG/1
650 TABLET ORAL EVERY 4 HOURS PRN
Start: 2020-08-17

## 2020-08-17 RX ADMIN — HYDROCODONE BITARTRATE AND ACETAMINOPHEN 1 TABLET: 10; 325 TABLET ORAL at 12:52

## 2020-08-17 RX ADMIN — HYDROCODONE BITARTRATE AND ACETAMINOPHEN 1 TABLET: 10; 325 TABLET ORAL at 04:21

## 2020-08-17 RX ADMIN — LOSARTAN POTASSIUM 25 MG: 50 TABLET, FILM COATED ORAL at 08:33

## 2020-08-17 RX ADMIN — LABETALOL HYDROCHLORIDE 100 MG: 200 TABLET, FILM COATED ORAL at 08:33

## 2020-08-17 RX ADMIN — SODIUM CHLORIDE, PRESERVATIVE FREE 10 ML: 5 INJECTION INTRAVENOUS at 08:34

## 2020-08-17 RX ADMIN — DULOXETINE HYDROCHLORIDE 60 MG: 30 CAPSULE, DELAYED RELEASE ORAL at 08:33

## 2020-08-17 RX ADMIN — FOLIC ACID 1 MG: 1 TABLET ORAL at 08:33

## 2020-08-17 RX ADMIN — LEVOTHYROXINE SODIUM 88 MCG: 88 TABLET ORAL at 07:07

## 2020-08-17 NOTE — THERAPY DISCHARGE NOTE
Acute Care - Occupational Therapy Treatment Note/Discharge  [facility]     Patient Name: Betty Dennis  : 1940  MRN: [id number]  Today's Date: 2020  Onset of Illness/Injury or Date of Surgery: 20  Date of Referral to OT: 20  Referring Physician: Dr. Patten      Admit Date: 2020    Visit Dx:     ICD-10-CM ICD-9-CM   1. Ataxia R27.0 781.3   2. Abnormal CT of the head R93.0 793.0   3. Anemia, unspecified type D64.9 285.9   4. Impaired cognition R41.89 294.9   5. Decreased activities of daily living (ADL) Z78.9 V49.89   6. Impaired mobility Z74.09 799.89   7. Chronic pain syndrome, osteoarthritis G89.4 338.4     Patient Active Problem List   Diagnosis   • Ataxia   • Falls frequently   • Primary osteoarthritis   • Abnormal CT scan   • Essential hypertension   • Normocytic anemia   • Acute pain due to trauma, fall   • Chronic pain syndrome, osteoarthritis       Therapy Treatment    Rehabilitation Treatment Summary     Row Name 20 1003 20 0952          Treatment Time/Intention    Discipline  occupational therapist  -CH  physical therapy assistant  -NW     Document Type  therapy note (daily note)  -  therapy note (daily note)  -NW     Subjective Information  complains of;weakness;pain  -[facility]  complains of;weakness  -NW2     Mode of Treatment  occupational therapy  -CH2  --     Patient Effort  good  -CH2  --     Existing Precautions/Restrictions  fall  -  fall  -NW2     Recorded by [CH] Luma King OTR/L 20 1003  [CH2] Luma King OTR/L 20 1053 [NW] Bhavna Barba, PTA 20 0952  [NW2] Bhavna Barba, PTA 20 1019     Row Name 20 1003             Cognitive Assessment/Intervention    Additional Documentation  Cognitive Assessment/Intervention (Group)  -      Recorded by [CH] Luma King OTR/L 20 1053      Row Name 20 1003             Cognitive Assessment/Intervention- PT/OT    Affect/Mental Status (Cognitive)  confused  -       Follows Commands (Cognition)  follows one step commands  -      Cognitive Function (Cognitive)  executive function deficit;safety deficit;memory deficit  -      Executive Function Deficit (Cognition)  moderate deficit;insight/awareness of deficits;planning/decision making;organization/sequencing  -      Memory Deficit (Cognitive)  moderate deficit;recall, recent events  -      Safety Deficit (Cognitive)  moderate deficit;insight into deficits/self awareness;judgment;problem solving;safety precautions awareness;safety precautions follow-through/compliance  -      Personal Safety Interventions  fall prevention program maintained;gait belt;muscle strengthening facilitated;nonskid shoes/slippers when out of bed;supervised activity  -      Recorded by [CH] Luma King, OTR/L 08/17/20 1053      Row Name 08/17/20 1003 08/17/20 0952          Safety Issues, Functional Mobility    Safety Issues Affecting Function (Mobility)  insight into deficits/self awareness;judgment;positioning of assistive device;problem solving;safety precaution awareness;safety precautions follow-through/compliance;sequencing abilities too far from AD  -  safety precaution awareness  -     Impairments Affecting Function (Mobility)  cognition;balance;strength;postural/trunk control;pain;range of motion (ROM)  -  cognition;balance;strength  -     Recorded by [CH] Luma King, OTR/L 08/17/20 1053 [NW] Bhavna Barba PTA 08/17/20 1019     Row Name 08/17/20 1003 08/17/20 0952          Bed Mobility Assessment/Treatment    Supine-Sit [place] (Bed Mobility)  --  verbal cues;minimum assist (75% patient effort);moderate assist (50% patient effort)  -     Sit-Supine [place] (Bed Mobility)  minimum assist (75% patient effort);verbal cues  -  -- w/ OT  -NW     Bed Mobility, Safety Issues  cognitive deficits limit understanding;decreased use of arms for pushing/pulling;decreased use of legs for bridging/pushing  -   decreased use of arms for pushing/pulling;decreased use of legs for bridging/pushing  -NW     Assistive Device (Bed Mobility)  --  bed rails;draw sheet  -NW     Recorded by [] Luma King OTR/L 08/17/20 1053 [NW] Bhavna Barba, PTA 08/17/20 1019     Row Name 08/17/20 1003             Functional Mobility    Functional Mobility- Ind. Level  contact guard assist;verbal cues required  -      Functional Mobility- Device  rolling walker  -      Functional Mobility- Safety Issues  balance decreased during turns;sequencing ability decreased;step length decreased;weight-shifting ability decreased  -      Recorded by [] Luma King OTR/L 08/17/20 1053      Row Name 08/17/20 1003             Transfer Assessment/Treatment    Transfer Assessment/Treatment  sit-stand transfer;stand-sit transfer;toilet transfer  -      Recorded by [] Luma King OTR/L 08/17/20 1053      Row Name 08/17/20 1003 08/17/20 0952          Sit-Stand Transfer    Sit-Stand [place] (Transfers)  contact guard;minimum assist (75% patient effort);verbal cues  -  verbal cues;minimum assist (75% patient effort);moderate assist (50% patient effort)  -NW     Assistive Device (Sit-Stand Transfers)  walker, front-wheeled  -  walker, front-wheeled upon standing pt has a posterior lean  -NW     Recorded by [CH] Luma King OTR/L 08/17/20 1053 [NW] Bhavna Barba, PTA 08/17/20 1019     Row Name 08/17/20 1003 08/17/20 0952          Stand-Sit Transfer    Stand-Sit [place] (Transfers)  contact guard;verbal cues;minimum assist (75% patient effort)  -  verbal cues;contact guard;minimum assist (75% patient effort)  -NW     Assistive Device (Stand-Sit Transfers)  walker, front-wheeled  -  walker, front-wheeled  -NW     Recorded by [] Luma King OTR/L 08/17/20 1053 [NW] Bhavna Barba, PTA 08/17/20 1019     Row Name 08/17/20 1003 08/17/20 0952          Toilet Transfer    Type (Toilet Transfer)  stand pivot/stand step   -CH  --     [place] Level (Toilet Transfer)  verbal cues;contact guard  -CH  verbal cues;contact guard  -NW     Assistive Device (Toilet Transfer)  commode;grab bars/safety frame;walker, front-wheeled  -CH  commode  -NW     Recorded by [CH] Luma King OTR/L 08/17/20 1053 [NW] Bhavna Barba CHANTALE, PTA 08/17/20 1019     Row Name 08/17/20 0952             Gait/Stairs Assessment/Training    [place] Level (Gait)  verbal cues;minimum assist (75% patient effort);contact guard  -NW      Assistive Device (Gait)  walker, front-wheeled  -NW      Distance in Feet (Gait)  15x2 pt amb around bed to chair rest amb to BR  -NW2      Pattern (Gait)  step-to  -NW      Deviations/Abnormal Patterns (Gait)  tracey decreased;stride length decreased;festinating/shuffling;base of support, narrow  -NW      Bilateral Gait Deviations  forward flexed posture;heel strike decreased  -NW      Comment (Gait/Stairs)  cues for safety and help w/ AD at times  -NW      Recorded by [NW] Bhavna Barba, PTA 08/17/20 1019  [NW2] Bhavna Barba, PTA 08/17/20 1024      Row Name 08/17/20 1003             ADL Assessment/Intervention    BADL Assessment/Intervention  upper body dressing;lower body dressing;bathing;grooming  -CH      Recorded by [CH] Luma King OTR/L 08/17/20 1053      Row Name 08/17/20 1003             Bathing Assessment/Intervention    Bathing [place] Level  moderate assist (50% patient effort);upper body;lower body  -CH      Bathing Position  unsupported sitting;supported standing  -CH      Comment (Bathing)  Pt. able to bathe UE, axillary area, chest, abdomen, proximal LEs all with S & cues to sequnce complete task.  Pt. required assist with back, distal LEs.  Cog deficits render cues necessary  -CH      Recorded by [CH] Luma King OTR/L 08/17/20 1053      Row Name 08/17/20 1003             Upper Body Dressing Assessment/Training    Upper Body Dressing [place] Level  moderate assist (50% patient  effort);bra/undergarment;minimum assist (75% patient effort) Min A hospital gown  -CH      Upper Body Dressing Position  unsupported sitting  -CH      Recorded by [CH] Luma King OTR/L 08/17/20 1053      Row Name 08/17/20 1003             Lower Body Dressing Assessment/Training    Lower Body Dressing [place] Level  maximum assist (25% patient effort);don;doff;socks  -CH      Lower Body Dressing Position  unsupported sitting seated on toilet  -CH      Comment (Lower Body Dressing)  Pt. seated on elevated surface & not able to reach feet to floor during LBD - this may have had impact on performance level as compared to previous rating   -CH      Recorded by [CH] Luma King OTR/L 08/17/20 1053      Row Name 08/17/20 1003             Grooming Assessment/Training    [place] Level (Grooming)  supervision;oral care regimen;wash face, hands  -CH      Grooming Position  supported standing;sink side  -CH      Recorded by [CH] Luma King OTR/L 08/17/20 1053      Row Name 08/17/20 1003             BADL Safety/Performance    Impairments, BADL Safety/Performance  balance;endurance/activity tolerance;cognition;coordination;pain;range of motion;strength;trunk/postural control  -CH      Recorded by [CH] Luma King OTR/L 08/17/20 1053      Row Name 08/17/20 1003 08/17/20 0952          Positioning and Restraints    Pre-Treatment Position  bathroom  -CH  in bed  -NW     Post Treatment Position  bed  -CH  bathroom  -NW     In Bed  fowlers;call light within reach;encouraged to call for assist;side rails up x2;LUE elevated  -CH  --     Bathroom  --  with OT  -NW     Recorded by [CH] Luma King OTR/L 08/17/20 1053 [NW] Bhavna Barba, LAINE 08/17/20 1019     Row Name 08/17/20 1003             Pain Assessment    Additional Documentation  Pain Scale: FACES Pre/Post-Treatment (Group)  -CH      Recorded by [CH] Luma King OTR/L 08/17/20 1053      Row Name 08/17/20 1003             Pain Scale: FACES  Pre/Post-Treatment    Pain: FACES Scale, Pretreatment  2-->hurts little bit  -CH      Pain: FACES Scale, Post-Treatment  4-->hurts little more  -CH      Recorded by [CH] Luma King, OTR/L 08/17/20 1053      Row Name 08/17/20 1003             Outcome Summary/Treatment Plan (OT)    Daily Summary of Progress (OT)  progress toward functional goals is good  -CH      Barriers to Overall Progress (OT)  decreased balance, baseline dementia  -CH      Plan for Continued Treatment (OT)  cont OT tx  -CH      Anticipated Discharge Disposition (OT)  [facility] nursing facility  -      Recorded by [CH] Luma King OTR/L 08/17/20 1053        User Key  (r) = Recorded By, (t) = Taken By, (c) = Cosigned By    Initials Name Effective Dates Discipline    CH Luma King, OTR/L 07/05/20 -  OT    NW Bhavna Barba, PTA 08/02/16 -  PT             Rehab Goal Summary     Row Name 08/17/20 1100             Transfer Goal 1 (OT)    Activity/Assistive Device (Transfer Goal 1, OT)  transfers, all  -CH      [place] Level/Cues Needed (Transfer Goal 1, OT)  contact guard assist  -CH      Time Frame (Transfer Goal 1, OT)  long term goal (LTG);by discharge  -CH      Progress/Outcome (Transfer Goal 1, OT)  goal not met  -CH         Dressing Goal 1 (OT)    Activity/Assistive Device (Dressing Goal 1, OT)  dressing skills, all  -CH      [place]/Cues Needed (Dressing Goal 1, OT)  moderate assist (50-74% patient effort)  -CH      Time Frame (Dressing Goal 1, OT)  long term goal (LTG);by discharge  -CH      Progress/Outcome (Dressing Goal 1, OT)  goal met  -         Toileting Goal 1 (OT)    Activity/Device (Toileting Goal 1, OT)  toileting skills, all;commode  -CH      [place] Level/Cues Needed (Toileting Goal 1, OT)  minimum assist (75% or more patient effort)  -CH      Time Frame (Toileting Goal 1, OT)  long term goal (LTG);by discharge  -CH      Progress/Outcome (Toileting Goal 1, OT)  goal not met  -         Balance Goal 1  (OT)    Activity/Assistive Device (Balance Goal 1, OT)  sitting, static;sitting, dynamic;standing, static;standing, dynamic  -      [place] Level/Cues Needed (Balance Goal 1, OT)  contact guard assist  -CH      Time Frame (Balance Goal 1, OT)  long term goal (LTG);by discharge  -      Progress/Outcomes (Balance Goal 1, OT)  goal met  -        User Key  (r) = Recorded By, (t) = Taken By, (c) = Cosigned By    Initials Name Provider Type Discipline    Luma Franco, OTR/L Occupational Therapist OT          Occupational Therapy Education                 Title: PT OT SLP Therapies (In Progress)     Topic: Occupational Therapy (In Progress)     Point: ADL training (Done)     Description:   Instruct learner(s) on proper safety adaptation and remediation techniques during self care or transfers.   Instruct in proper use of assistive devices.              Learning Progress Summary           Patient Acceptance, E,D, VU,NR by  at 8/17/2020 1055    Acceptance, E, VU by WILVER at 8/15/2020 1517                   Point: Home exercise program (Not Started)     Description:   Instruct learner(s) on appropriate technique for monitoring, assisting and/or progressing therapeutic exercises/activities.              Learner Progress:   Not documented in this visit.          Point: Precautions (Done)     Description:   Instruct learner(s) on prescribed precautions during self-care and functional transfers.              Learning Progress Summary           Patient Acceptance, E, VU by WILVER at 8/15/2020 1517                   Point: Body mechanics (Done)     Description:   Instruct learner(s) on proper positioning and spine alignment during self-care, functional mobility activities and/or exercises.              Learning Progress Summary           Patient Acceptance, E,D, VU,NR by  at 8/17/2020 1055    Acceptance, E, VU by WILVER at 8/15/2020 1517                               User Key     Initials Effective Dates Name Provider Type  Discipline     07/05/20 -  Luma King, OTR/L Occupational Therapist OT    JCHRIS 07/05/20 -  Brittny Guardado OTR/L Occupational Therapist OT                OT Recommendation and Plan  Outcome Summary/Treatment Plan (OT)  Daily Summary of Progress (OT): progress toward functional goals is good  Barriers to Overall Progress (OT): decreased balance, baseline dementia  Plan for Continued Treatment (OT): cont OT tx  Anticipated Discharge Disposition (OT): skilled nursing facility  Daily Summary of Progress (OT): progress toward functional goals is good    Outcome Measures     Row Name 08/17/20 1053 08/17/20 1000 08/16/20 1404       How much help from another person do you currently need...    Turning from your back to your side while in flat bed without using bedrails?  --  2  -NW  --    Moving from lying on back to sitting on the side of a flat bed without bedrails?  --  2  -NW  --    Moving to and from a bed to a chair (including a wheelchair)?  --  3  -NW  --    Standing up from a chair using your arms (e.g., wheelchair, bedside chair)?  --  3  -NW  --    Climbing 3-5 steps with a railing?  --  2  -NW  --    To walk in hospital room?  --  3  -NW  --    AM-PAC 6 Clicks Score (PT)  --  15  -NW  --       How much help from another is currently needed...    Putting on and taking off regular lower body clothing?  2  -CH  --  1  -MT    Bathing (including washing, rinsing, and drying)  3  -CH  --  2  -MT    Toileting (which includes using toilet bed pan or urinal)  3  -CH  --  2  -MT    Putting on and taking off regular upper body clothing  3  -CH  --  3  -MT    Taking care of personal grooming (such as brushing teeth)  4  -CH  --  4  -MT    Eating meals  4  -CH  --  4  -MT    AM-PAC 6 Clicks Score (OT)  19  -CH  --  16  -MT       Functional Assessment    Outcome Measure Options  --  AM-PAC 6 Clicks Basic Mobility (PT)  -NW  --    Row Name 08/16/20 1100 08/15/20 1500          How much help from another person do you  currently need...    Turning from your back to your side while in flat bed without using bedrails?  2  -NW  --     Moving from lying on back to sitting on the side of a flat bed without bedrails?  2  -NW  --     Moving to and from a bed to a chair (including a wheelchair)?  3  -NW  --     Standing up from a chair using your arms (e.g., wheelchair, bedside chair)?  3  -NW  --     Climbing 3-5 steps with a railing?  1  -NW  --     To walk in hospital room?  3  -NW  --     AM-PAC 6 Clicks Score (PT)  14  -NW  --        How much help from another is currently needed...    Putting on and taking off regular lower body clothing?  --  1  -JJ     Bathing (including washing, rinsing, and drying)  --  2  -JJ     Toileting (which includes using toilet bed pan or urinal)  --  2  -JJ     Putting on and taking off regular upper body clothing  --  3  -JJ     Taking care of personal grooming (such as brushing teeth)  --  4  -JJ     Eating meals  --  4  -JJ     AM-PAC 6 Clicks Score (OT)  --  16  -        Functional Assessment    Outcome Measure Options  AM-PAC 6 Clicks Basic Mobility (PT)  -NW  AM-PAC 6 Clicks Daily Activity (OT)  -       User Key  (r) = Recorded By, (t) = Taken By, (c) = Cosigned By    Initials Name Provider Type     Luma King, OTR/L Occupational Therapist     Bhavna Barba, LAINE Physical Therapy Assistant    Kadi Virk COTA/L Occupational Therapy Assistant    Brittny Beltran, OTR/L Occupational Therapist           Time Calculation:    Time Calculation- OT     Row Name 08/17/20 1023 08/17/20 1003          Time Calculation- OT    OT Start Time  --  1003  -     OT Stop Time  --  1041  -     OT Time Calculation (min)  --  38 min  -     Total Timed Code Minutes- OT  --  38 minute(s)  -     OT Received On  --  08/17/20  -     OT Goal Re-Cert Due Date  --  08/25/20  -        Timed Charges    02896 - Gait Training Minutes   23  -NW  --       User Key  (r) = Recorded By, (t) =  Taken By, (c) = Cosigned By    Initials Name Provider Type    CH Luma King, OTR/L Occupational Therapist    NW Bhavna Barba, PTA Physical Therapy Assistant        Therapy Suggested Charges     Code   Minutes Charges    52908 (CPT®) Hc Ot Neuromusc Re Education Ea 15 Min      07638 (CPT®) Hc Ot Ther Proc Ea 15 Min      20027 (CPT®) Hc Ot Therapeutic Act Ea 15 Min 10 1    79836 (CPT®) Hc Ot Manual Therapy Ea 15 Min      72625 (CPT®) Hc Ot Iontophoresis Ea 15 Min      07458 (CPT®) Hc Ot Elec Stim Ea-Per 15 Min      30101 (CPT®) Hc Ot Ultrasound Ea 15 Min      53904 (CPT®) Hc Ot Self Care/Mgmt/Train Ea 15 Min 29 2    Total  39 3        Therapy Charges for Today     Code Description Service Date Service Provider Modifiers Qty    [id number] HC OT SELF CARE/MGMT/TRAIN EA 15 MIN 8/17/2020 Luma King OTR/L GO 3               OT Discharge Summary  Anticipated Discharge Disposition (OT): skilled nursing facility    DRE Saenz/BUNNY  8/17/2020

## 2020-08-17 NOTE — PLAN OF CARE
Problem: Patient Care Overview  Goal: Plan of Care Review  Outcome: Ongoing (interventions implemented as appropriate)  Flowsheets  Taken 8/17/2020 1415  Progress: improving  Outcome Summary: Pt alert but forgetful, incontient at times, but can use BSC. C/o pain, prn meds controlling pain. Assist when out of bed. Daughter at bedside this am.  Taken 8/17/2020 0870  Plan of Care Reviewed With: patient

## 2020-08-17 NOTE — PLAN OF CARE
Problem: Patient Care Overview  Goal: Plan of Care Review  Flowsheets  Taken 8/17/2020 0327 by Castleman, Tamara K, RN  Progress: improving  Taken 8/17/2020 0833 by Maurice Conti RN  Plan of Care Reviewed With: patient  Note:   OT tx completed.  Pt was assisted to bathroom by PTA.  OTR assisted pt with TB sponge bath, gown change, & oral care standing at sink.  During TBB pt required mod cues to sequence self bathing with completeness.  She perseverated on a small area of her body but was easily directed with appropriate cues.  Ms. Dennis required assist with her back, distal LEs & CGA while standing to cleanse her tuan area.  She completed t/fs at CGA/Min A with cues for body mechs to improve her indep & decrease her fall risk.  She would benefit from cont'd OT tx to improve her indep in selfcare & fxl mob.  She wishes to DC to [facility].  Agree with this DC plan.  She has made good fxl progress & I anticipate she will cont to during rehab.

## 2020-08-17 NOTE — PROGRESS NOTES
Continued Stay Note   [place]     Patient Name: Betty Dennis  MRN: [id number]  Today's Date: 8/17/2020    Admit Date: 8/14/2020    Discharge Plan     Row Name 08/17/20 0941       Plan    Plan  SNF    Plan Comments  PT may dc to [facility] today. DC summary and narcotic prescriptions need to be faxed prior to calling report.  Phone [phone]  Fax [phone]    Final Discharge Disposition Code  03 - skilled nursing facility (SNF)        Discharge Codes    No documentation.             GATITO Davidson

## 2020-08-17 NOTE — PLAN OF CARE
Patient is alert and oriented x 4. Sleeping through the night.Generalized weakness.  Medicated for c/o pain with prn pain med, relief noted. VSS. Tele:nsr.  Refuse to wear scd's. Incontinent. Still need to collect occult bld. She had stool collected yesterday, but the occult bld card was . Need to recollect. Has referral to [facility]. Safety maintained.

## 2020-08-17 NOTE — THERAPY TREATMENT NOTE
Acute Care - Occupational Therapy Treatment Note  [facility]     Patient Name: Betty Dennis  : 1940  MRN: [id number]  Today's Date: 2020  Onset of Illness/Injury or Date of Surgery: 20  Date of Referral to OT: 20  Referring Physician: Dr. Patten    Admit Date: 2020       ICD-10-CM ICD-9-CM   1. Ataxia R27.0 781.3   2. Abnormal CT of the head R93.0 793.0   3. Anemia, unspecified type D64.9 285.9   4. Impaired cognition R41.89 294.9   5. Decreased activities of daily living (ADL) Z78.9 V49.89   6. Impaired mobility Z74.09 799.89   7. Chronic pain syndrome, osteoarthritis G89.4 338.4     Patient Active Problem List   Diagnosis   • Ataxia   • Falls frequently   • Primary osteoarthritis   • Abnormal CT scan   • Essential hypertension   • Normocytic anemia   • Acute pain due to trauma, fall   • Chronic pain syndrome, osteoarthritis     Past Medical History:   Diagnosis Date   • Chronic pain syndrome, secondary to OA    • Frequent falls    • Hypertension    • Hypothyroidism    • Osteoarthritis    • Pseudogout      Past Surgical History:   Procedure Laterality Date   • BREAST SURGERY     • HIP BIPOLAR REPLACEMENT      bilateral       Therapy Treatment    Rehabilitation Treatment Summary     Row Name 20 1003 20 0952          Treatment Time/Intention    Discipline  occupational therapist  -CH  physical therapy assistant  -NW     Document Type  therapy note (daily note)  -  therapy note (daily note)  -NW     Subjective Information  complains of;weakness;pain  -CH2  complains of;weakness  -NW2     Mode of Treatment  occupational therapy  -CH2  --     Patient Effort  good  -CH2  --     Existing Precautions/Restrictions  fall  -  fall  -NW2     Recorded by [CH] Luma King, OTR/L 20 1003  [CH2] Luma King, OTR/L 20 1053 [NW] Bhavna Barba, PTA 20 0952  [NW2] Bhavna Barba, PTA 20 1019     Row Name 20 1003             Cognitive  Assessment/Intervention    Additional Documentation  Cognitive Assessment/Intervention (Group)  -      Recorded by [CH] Luma King OTR/L 08/17/20 1053      Row Name 08/17/20 1003             Cognitive Assessment/Intervention- PT/OT    Affect/Mental Status (Cognitive)  confused  -      Follows Commands (Cognition)  follows one step commands  -      Cognitive Function (Cognitive)  executive function deficit;safety deficit;memory deficit  -      Executive Function Deficit (Cognition)  moderate deficit;insight/awareness of deficits;planning/decision making;organization/sequencing  -      Memory Deficit (Cognitive)  moderate deficit;recall, recent events  -      Safety Deficit (Cognitive)  moderate deficit;insight into deficits/self awareness;judgment;problem solving;safety precautions awareness;safety precautions follow-through/compliance  -      Personal Safety Interventions  fall prevention program maintained;gait belt;muscle strengthening facilitated;nonskid shoes/slippers when out of bed;supervised activity  -      Recorded by [CH] Luma King OTR/L 08/17/20 1053      Row Name 08/17/20 1003 08/17/20 0952          Safety Issues, Functional Mobility    Safety Issues Affecting Function (Mobility)  insight into deficits/self awareness;judgment;positioning of assistive device;problem solving;safety precaution awareness;safety precautions follow-through/compliance;sequencing abilities too far from AD  -  safety precaution awareness  -     Impairments Affecting Function (Mobility)  cognition;balance;strength;postural/trunk control;pain;range of motion (ROM)  -  cognition;balance;strength  -NW     Recorded by [CH] Luma King, OTR/L 08/17/20 1053 [NW] Bhavna Barba PTA 08/17/20 1019     Row Name 08/17/20 1003 08/17/20 0952          Bed Mobility Assessment/Treatment    Supine-Sit [place] (Bed Mobility)  --  verbal cues;minimum assist (75% patient effort);moderate assist (50% patient  effort)  -NW     Sit-Supine [place] (Bed Mobility)  minimum assist (75% patient effort);verbal cues  -  -- w/ OT  -NW     Bed Mobility, Safety Issues  cognitive deficits limit understanding;decreased use of arms for pushing/pulling;decreased use of legs for bridging/pushing  -  decreased use of arms for pushing/pulling;decreased use of legs for bridging/pushing  -NW     Assistive Device (Bed Mobility)  --  bed rails;draw sheet  -NW     Recorded by [CH] Luma King OTR/L 08/17/20 1053 [NW] Bhavna Barba, PTA 08/17/20 1019     Row Name 08/17/20 1003             Functional Mobility    Functional Mobility- Ind. Level  contact guard assist;verbal cues required  -      Functional Mobility- Device  rolling walker  -      Functional Mobility- Safety Issues  balance decreased during turns;sequencing ability decreased;step length decreased;weight-shifting ability decreased  -      Recorded by [CH] Luma King OTR/L 08/17/20 1053      Row Name 08/17/20 1003             Transfer Assessment/Treatment    Transfer Assessment/Treatment  sit-stand transfer;stand-sit transfer;toilet transfer  -      Recorded by [CH] Luma King OTR/L 08/17/20 1053      Row Name 08/17/20 1003 08/17/20 0952          Sit-Stand Transfer    Sit-Stand [place] (Transfers)  contact guard;minimum assist (75% patient effort);verbal cues  -  verbal cues;minimum assist (75% patient effort);moderate assist (50% patient effort)  -NW     Assistive Device (Sit-Stand Transfers)  walker, front-wheeled  -  walker, front-wheeled upon standing pt has a posterior lean  -NW     Recorded by [CH] Luma King OTR/L 08/17/20 1053 [NW] Bhavna Barba, PTA 08/17/20 1019     Row Name 08/17/20 1003 08/17/20 0952          Stand-Sit Transfer    Stand-Sit [place] (Transfers)  contact guard;verbal cues;minimum assist (75% patient effort)  -  verbal cues;contact guard;minimum assist (75% patient effort)  -NW     Assistive Device  (Stand-Sit Transfers)  walker, front-wheeled  -CH  walker, front-wheeled  -NW     Recorded by [CH] Luma King OTR/L 08/17/20 1053 [NW] Bhavna Barba, PTA 08/17/20 1019     Row Name 08/17/20 1003 08/17/20 0952          Toilet Transfer    Type (Toilet Transfer)  stand pivot/stand step  -CH  --     [place] Level (Toilet Transfer)  verbal cues;contact guard  -CH  verbal cues;contact guard  -NW     Assistive Device (Toilet Transfer)  commode;grab bars/safety frame;walker, front-wheeled  -CH  commode  -NW     Recorded by [CH] Luma King OTR/BUNNY 08/17/20 1053 [NW] Bhavna Barba, [facility] 08/17/20 1019     Row Name 08/17/20 0952             Gait/Stairs Assessment/Training    [place] Level (Gait)  verbal cues;minimum assist (75% patient effort);contact guard  -NW      Assistive Device (Gait)  walker, front-wheeled  -NW      Distance in Feet (Gait)  15x2 pt amb around bed to chair rest amb to BR  -NW2      Pattern (Gait)  step-to  -NW      Deviations/Abnormal Patterns (Gait)  tracey decreased;stride length decreased;festinating/shuffling;base of support, narrow  -NW      Bilateral Gait Deviations  forward flexed posture;heel strike decreased  -NW      Comment (Gait/Stairs)  cues for safety and help w/ AD at times  -NW      Recorded by [NW] Bhavna Barba, PTA 08/17/20 1019  [NW2] Bhavna Barba, [facility] 08/17/20 1024      Row Name 08/17/20 1003             ADL Assessment/Intervention    BADL Assessment/Intervention  upper body dressing;lower body dressing;bathing;grooming  -CH      Recorded by [CH] Luma King OTR/L 08/17/20 1053      Row Name 08/17/20 1003             Bathing Assessment/Intervention    Bathing [place] Level  moderate assist (50% patient effort);upper body;lower body  -CH      Bathing Position  unsupported sitting;supported standing  -CH      Comment (Bathing)  Pt. able to bathe UE, axillary area, chest, abdomen, proximal LEs all with S & cues to sequnce complete task.  Pt.  required assist with back, distal LEs.  Cog deficits render cues necessary  -CH      Recorded by [] Luma King OTR/L 08/17/20 1053      Row Name 08/17/20 1003             Upper Body Dressing Assessment/Training    Upper Body Dressing [place] Level  moderate assist (50% patient effort);bra/undergarment;minimum assist (75% patient effort) Min A hospital gown  -CH      Upper Body Dressing Position  unsupported sitting  -CH      Recorded by [] Luma King OTR/L 08/17/20 1053      Row Name 08/17/20 1003             Lower Body Dressing Assessment/Training    Lower Body Dressing [place] Level  maximum assist (25% patient effort);don;doff;socks  -CH      Lower Body Dressing Position  unsupported sitting seated on toilet  -CH      Comment (Lower Body Dressing)  Pt. seated on elevated surface & not able to reach feet to floor during LBD - this may have had impact on performance level as compared to previous rating   -CH      Recorded by [] Luma King OTR/L 08/17/20 1053      Row Name 08/17/20 1003             Grooming Assessment/Training    [place] Level (Grooming)  supervision;oral care regimen;wash face, hands  -CH      Grooming Position  supported standing;sink side  -CH      Recorded by [] Luma King OTR/L 08/17/20 1053      Row Name 08/17/20 1003             BADL Safety/Performance    Impairments, BADL Safety/Performance  balance;endurance/activity tolerance;cognition;coordination;pain;range of motion;strength;trunk/postural control  -CH      Recorded by [] Luma King OTR/L 08/17/20 1053      Row Name 08/17/20 1003 08/17/20 0952          Positioning and Restraints    Pre-Treatment Position  bathroom  -CH  in bed  -NW     Post Treatment Position  bed  -CH  bathroom  -NW     In Bed  fowlers;call light within reach;encouraged to call for assist;side rails up x2;LUE elevated  -CH  --     Bathroom  --  with OT  -NW     Recorded by [CH] Luma King OTR/L 08/17/20 1053 [NW]  Bhavna Barba, PTA 08/17/20 1019     Row Name 08/17/20 1003             Pain Assessment    Additional Documentation  Pain Scale: FACES Pre/Post-Treatment (Group)  -      Recorded by [CH] Luma King OTR/L 08/17/20 1053      Row Name 08/17/20 1003             Pain Scale: FACES Pre/Post-Treatment    Pain: FACES Scale, Pretreatment  2-->hurts little bit  -CH      Pain: FACES Scale, Post-Treatment  4-->hurts little more  -      Recorded by [CH] Luma King OTR/L 08/17/20 1053      Row Name 08/17/20 1003             Outcome Summary/Treatment Plan (OT)    Daily Summary of Progress (OT)  progress toward functional goals is good  -      Barriers to Overall Progress (OT)  decreased balance, baseline dementia  -      Plan for Continued Treatment (OT)  cont OT tx  -CH      Anticipated Discharge Disposition (OT)  skilled nursing facility  -      Recorded by [] Luma King OTR/L 08/17/20 1053        User Key  (r) = Recorded By, (t) = Taken By, (c) = Cosigned By    Initials Name Effective Dates Discipline    CH Luma King OTR/L 07/05/20 -  OT    NW Bhavna Barba, PTA 08/02/16 -  PT             Occupational Therapy Education                 Title: PT OT SLP Therapies (In Progress)     Topic: Occupational Therapy (In Progress)     Point: ADL training (Done)     Description:   Instruct learner(s) on proper safety adaptation and remediation techniques during self care or transfers.   Instruct in proper use of assistive devices.              Learning Progress Summary           Patient Acceptance, E,D, VU,NR by  at 8/17/2020 1055    Acceptance, E, VU by JJ at 8/15/2020 1517                   Point: Home exercise program (Not Started)     Description:   Instruct learner(s) on appropriate technique for monitoring, assisting and/or progressing therapeutic exercises/activities.              Learner Progress:   Not documented in this visit.          Point: Precautions (Done)     Description:   Instruct  learner(s) on prescribed precautions during self-care and functional transfers.              Learning Progress Summary           Patient Acceptance, E, VU by WILVER at 8/15/2020 1517                   Point: Body mechanics (Done)     Description:   Instruct learner(s) on proper positioning and spine alignment during self-care, functional mobility activities and/or exercises.              Learning Progress Summary           Patient Acceptance, E,D, VU,NR by  at 8/17/2020 1055    Acceptance, E, VU by WILVER at 8/15/2020 1517                               User Key     Initials Effective Dates Name Provider Type Discipline     07/05/20 -  Luma King, OTR/L Occupational Therapist OT    WILVER 07/05/20 -  Brittny Guardado OTR/L Occupational Therapist OT                OT Recommendation and Plan  Outcome Summary/Treatment Plan (OT)  Daily Summary of Progress (OT): progress toward functional goals is good  Barriers to Overall Progress (OT): decreased balance, baseline dementia  Plan for Continued Treatment (OT): cont OT tx  Anticipated Discharge Disposition (OT): skilled nursing facility  Daily Summary of Progress (OT): progress toward functional goals is good  Outcome Measures     Row Name 08/17/20 1053 08/17/20 1000 08/16/20 1404       How much help from another person do you currently need...    Turning from your back to your side while in flat bed without using bedrails?  --  2  -NW  --    Moving from lying on back to sitting on the side of a flat bed without bedrails?  --  2  -NW  --    Moving to and from a bed to a chair (including a wheelchair)?  --  3  -NW  --    Standing up from a chair using your arms (e.g., wheelchair, bedside chair)?  --  3  -NW  --    Climbing 3-5 steps with a railing?  --  2  -NW  --    To walk in hospital room?  --  3  -NW  --    AM-PAC 6 Clicks Score (PT)  --  15  -NW  --       How much help from another is currently needed...    Putting on and taking off regular lower body clothing?  2  -   --  1  -MT    Bathing (including washing, rinsing, and drying)  3  -CH  --  2  -MT    Toileting (which includes using toilet bed pan or urinal)  3  -CH  --  2  -MT    Putting on and taking off regular upper body clothing  3  -CH  --  3  -MT    Taking care of personal grooming (such as brushing teeth)  4  -CH  --  4  -MT    Eating meals  4  -CH  --  4  -MT    AM-PAC 6 Clicks Score (OT)  19  -CH  --  16  -MT       Functional Assessment    Outcome Measure Options  --  AM-PAC 6 Clicks Basic Mobility (PT)  -NW  --    Row Name 08/16/20 1100 08/15/20 1500          How much help from another person do you currently need...    Turning from your back to your side while in flat bed without using bedrails?  2  -NW  --     Moving from lying on back to sitting on the side of a flat bed without bedrails?  2  -NW  --     Moving to and from a bed to a chair (including a wheelchair)?  3  -NW  --     Standing up from a chair using your arms (e.g., wheelchair, bedside chair)?  3  -NW  --     Climbing 3-5 steps with a railing?  1  -NW  --     To walk in hospital room?  3  -NW  --     AM-PAC 6 Clicks Score (PT)  14  -NW  --        How much help from another is currently needed...    Putting on and taking off regular lower body clothing?  --  1  -JJ     Bathing (including washing, rinsing, and drying)  --  2  -JJ     Toileting (which includes using toilet bed pan or urinal)  --  2  -JJ     Putting on and taking off regular upper body clothing  --  3  -JJ     Taking care of personal grooming (such as brushing teeth)  --  4  -JJ     Eating meals  --  4  -JJ     AM-PAC 6 Clicks Score (OT)  --  16  -JJ        Functional Assessment    Outcome Measure Options  AM-PAC 6 Clicks Basic Mobility (PT)  -NW  AM-PAC 6 Clicks Daily Activity (OT)  -JJ       User Key  (r) = Recorded By, (t) = Taken By, (c) = Cosigned By    Initials Name Provider Type    Luma Franco, OTR/L Occupational Therapist    NW Bhavna Barba, LAINE Physical Therapy Assistant     Kadi Virk PEOPLES/L Occupational Therapy Assistant    Brittny [facility], OTR/L Occupational Therapist           Time Calculation:   Time Calculation- OT     Row Name 08/17/20 1023 08/17/20 1003          Time Calculation- OT    OT Start Time  --  1003  -     OT Stop Time  --  1041  -     OT Time Calculation (min)  --  38 min  -     Total Timed Code Minutes- OT  --  38 minute(s)  -     OT Received On  --  08/17/20  -     OT Goal Re-Cert Due Date  --  08/25/20  -        Timed Charges    94695 - Gait Training Minutes   23  -NW  --       User Key  (r) = Recorded By, (t) = Taken By, (c) = Cosigned By    Initials Name Provider Type     Luma King, OTR/L Occupational Therapist    NW Bhavna Barba, PTA Physical Therapy Assistant        Therapy Charges for Today     Code Description Service Date Service Provider Modifiers Qty    [id number] HC OT SELF CARE/MGMT/TRAIN EA 15 MIN 8/17/2020 Luma King, OTR/L GO 3               Luma King OTR/L  8/17/2020

## 2020-08-17 NOTE — DISCHARGE SUMMARY
[facility] Medicine Services  DISCHARGE SUMMARY     Date of Admission: 8/14/2020  Date of Discharge:  8/17/2020  Primary Care Physician: Hardik Taveras DO    Presenting Problem/History of Present Illness:  Ataxia [R27.0]     Discharge Diagnoses:  Active Hospital Problems    Diagnosis   • **Ataxia   • Falls frequently   • Primary osteoarthritis   • Abnormal CT scan   • Essential hypertension   • Normocytic anemia   • Acute pain due to trauma, fall   • Chronic pain syndrome, osteoarthritis     Chief Complaint on Day of Discharge: No complaints, weak but some better.  History of Present Illness on Day of Discharge:   Lying in bed.  No complaints today other than still complains of weakness and balance off.  Denies nausea, vomiting or abdominal pain.  Denies chest pain, palpitations, or shortness of breath.  Her daughter is present in room.  Hopes for skilled nursing facility today.    Consults: None    Procedures Performed: None    Pertinent Test Results:   Imaging Results (All)     Procedure Component Value Units Date/Time    MRI Brain Without Contrast [[id number]] Collected:  08/15/20 1001     Updated:  08/15/20 1009    Narrative:       Exam: MRI BRAIN WO CONTRAST-     Indication: Hydrocephalus, idiopathic, norm pressure; R27.0-Ataxia,  unspecified; R93.0-Abnormal findings on diagnostic imaging of skull and  head, not elsewhere classified; D64.9-Anemia, unspecified     Comparison: CT head 8/14/2020     Findings:     No diffusion restriction to suggest acute infarction. No increased  susceptibility to suggest intracranial hemorrhage. Major physiologic  flow voids are maintained.     Patchy and confluent periventricular and subcortical T2 FLAIR  hyperintense white matter lesions likely represent sequela of chronic  microvascular ischemic change. No other significant abnormality of brain  parenchyma signal intensity. No midline shift or mass effect. Global  cerebral volume  loss with ex vacuo ventricular dilatation is again  noted. Third ventricle measures 11 mm transverse dimension. Ventricular  caliber is stable from prior day head CT. Basilar cisterns are patent.  Sella turcica and its contents appear unremarkable.     No acute orbital finding. Trace LEFT mastoid effusion. Paranasal sinuses  appear clear.       Impression:       Impression:  1.  No acute intracranial findings. No acute infarct.  2.  Chronic microvascular ischemic white matter change and global  cerebral volume loss. Presumed ex vacuo ventricular dilatation with  stable ventricular caliber compared to one day prior.     This report was finalized on 08/15/2020 10:06 by Dr. Blane Pacheco MD.    XR Ankle 3+ View Right [[id number]] Collected:  08/14/20 1856     Updated:  08/14/20 1904    Narrative:       Exam: XR ANKLE 3+ VW RIGHT-     Indication: Fall; R27.0-Ataxia, unspecified; R93.0-Abnormal findings on  diagnostic imaging of skull and head, not elsewhere classified;  D64.9-Anemia, unspecified     Comparison: None     Findings:     Ankle mortise is congruent. Talar dome is intact. No acute fracture or  dislocation. No suspicious bone lesion. The bones are demineralized. No  radiopaque foreign body or soft tissue gas.       Impression:       Impression:     No acute osseous findings.  This report was finalized on 08/14/2020 19:01 by Dr. Blane Pacheco MD.    CT Thoracic Spine Without Contrast [[id number]] Collected:  08/14/20 1705     Updated:  08/14/20 1713    Narrative:       Exam: CT THORACIC SPINE WO CONTRAST-     Indication: T/L-spine trauma, minor-mod, low back pain     Comparison: None     Dose length product: 641 mGy cm. Automated exposure control was also  utilized to decrease patient radiation dose.     Findings:     Vertebral body heights are maintained. No acute fracture or subluxation.  Mild dextrocurvature the lumbar spine is noted. Moderate multilevel  degenerative change characterized by endplate  osteophytes and facet  arthropathy. Included lungs are clear. No visible pneumothorax.       Impression:       Impression:     No acute fracture or subluxation. Mild dextrocurvature and moderate  multilevel degenerative change of the thoracic spine.  This report was finalized on 08/14/2020 17:10 by Dr. Blane Pacheco MD.    CT Lumbar Spine Without Contrast [[id number]] Collected:  08/14/20 1657     Updated:  08/14/20 1704    Narrative:       EXAMINATION:   CT LUMBAR SPINE WO CONTRAST-  8/14/2020 4:57 PM CDT     HISTORY: CT lumbar spine without contrast 8/14/2020 3:57 PM CDT     History: T/L-spine trauma, minor-mod, low back pain     Comparison: None      DLP: 544 mGy cm     Technique: Serial helical tomographic images of the lumbar spine were  obtained without the use of intravenous contrast. Additionally, sagittal  and coronal reformatted images were provided for review.      Findings:   Bilateral pars defects are present at L5. There is a 10 mm  anterolisthesis of L5 on S1. The remainder of the lumbar vertebra are  relatively aligned.. Vertebral body heights are well maintained. Vacuum  phenomenon is present at all disc spaces.. There is no bony narrowing of  the spinal canal or neuroforamina. Vascular calcifications present  abdominal aorta..      SI joints are unremarkable.        Impression:       Impression:   1. Degenerative spondylosis is noted with degenerative disc disease at  all levels. Bilateral pars defects at L5 with 10 mm anterolisthesis of  L5 on S1.        This report was finalized on 08/14/2020 17:01 by Dr. Jacob Pickard MD.    CT Cervical Spine Without Contrast [[id number]] Collected:  08/14/20 1638     Updated:  08/14/20 1651    Narrative:       Exam: CT CERVICAL SPINE WO CONTRAST-     Indication: C-spine trauma, NEXUS/CCR positive, +risk factor(s)     Comparison: None     Dose length product: 290 mGy cm. Automated exposure control was also  utilized to decrease patient radiation dose.        Findings:     Skull base relationships are maintained. Odontoid process is intact.  There is reversal of normal cervical lordosis. 3 mm anterolisthesis of  C3 on C4 and C4 on C5. Vertebral body heights are maintained. No acute  fracture. Severe multilevel degenerative change with varying degrees of  moderate to severe multilevel neural foraminal stenosis. No acute soft  tissue finding. No apical pneumothorax.       Impression:       Impression:  1.  No acute fracture.  2.  3 mm anterolisthesis of C3 on C4 and C4 on C5, likely related to  degenerative change.  3.  Reversal of normal cervical lordosis.  4.  Severe multilevel cervical spine degenerative change with multilevel  neural foraminal stenosis.     This report was finalized on 08/14/2020 16:48 by Dr. Blane Pacheco MD.    CT Head Without Contrast [[id number]] Collected:  08/14/20 1633     Updated:  08/14/20 1640    Narrative:       Exam: CT HEAD WO CONTRAST-     Indication: Head trauma, headache     Comparison: None     Dose length product: 758 mGy cm. Automated exposure control was also  utilized to decrease patient radiation dose.     Findings:     No acute intracranial hemorrhage. No loss of gray-white differentiation.  Chronic microvascular ischemic white matter change and global cerebral  volume loss is noted. Ex vacuo ventricular dilatation. Basilar cisterns  are patent. No midline shift or mass effect. Visualized portion of the  orbits appear unremarkable. The mastoid air cells and visualized  paranasal sinuses are clear. No acute osseous finding.       Impression:       Impression:     1.  No acute intracranial findings.  2.  Global cerebral volume loss and presumed chronic microvascular  ischemic white matter change. The degree of ventricular dilatation is  questionably disproportionate to the background of underlying volume  loss raising the possibility of normal pressure hydrocephalus. Please  clinically correlate.  This report was finalized on  08/14/2020 16:37 by Dr. Blane Pacheco MD.          Laboratory Data:    Results from last 7 days   Lab Units 08/16/20  0552 08/15/20  0507 08/14/20  1549   WBC 10*3/mm3 9.28 9.99 9.02   HEMOGLOBIN g/dL 8.4* 8.3* 8.8*   HEMATOCRIT % 27.3* 26.4* 28.2*   PLATELETS 10*3/mm3 365 384 413        Results from last 7 days   Lab Units 08/16/20  0552 08/15/20  0507 08/14/20  1549   SODIUM mmol/L 139 141 140   POTASSIUM mmol/L 3.7 3.9 4.3   CHLORIDE mmol/L 103 102 98   CO2 mmol/L 28.0 28.0 29.0   BUN mg/dL 21 21 23   CREATININE mg/dL 0.54* 0.58 0.65   CALCIUM mg/dL 9.3 9.4 9.3   BILIRUBIN mg/dL 0.2 0.2 0.2   ALK PHOS U/L 69 71 77   ALT (SGPT) U/L 8 11 9   AST (SGOT) U/L 11 25 12   GLUCOSE mg/dL 96 111* 135*     Lab Results (all)     Procedure Component Value Units Date/Time    Occult Blood X 1, Stool - Stool, Per Rectum [[id number]] Updated:  08/16/20 1250    Specimen:  Stool from Per Rectum     Comprehensive Metabolic Panel [[id number]]  (Abnormal) Collected:  08/16/20 0552    Specimen:  Blood Updated:  08/16/20 0642     Glucose 96 mg/dL      BUN 21 mg/dL      Creatinine 0.54 mg/dL      Sodium 139 mmol/L      Potassium 3.7 mmol/L      Chloride 103 mmol/L      CO2 28.0 mmol/L      Calcium 9.3 mg/dL      Total Protein 5.9 g/dL      Albumin 3.70 g/dL      ALT (SGPT) 8 U/L      AST (SGOT) 11 U/L      Alkaline Phosphatase 69 U/L      Total Bilirubin 0.2 mg/dL      eGFR Non African Amer 109 mL/min/1.73      Globulin 2.2 gm/dL      A/G Ratio 1.7 g/dL      BUN/Creatinine Ratio 38.9     Anion Gap 8.0 mmol/L     Narrative:       GFR Normal >60  Chronic Kidney Disease <60  Kidney Failure <15      Protime-INR [[id number]]  (Normal) Collected:  08/16/20 0552    Specimen:  Blood Updated:  08/16/20 0635     Protime 13.6 Seconds      INR 1.08    CBC (No Diff) [[id number]]  (Abnormal) Collected:  08/16/20 0552    Specimen:  Blood Updated:  08/16/20 0623     WBC 9.28 10*3/mm3      RBC 3.13 10*6/mm3      Hemoglobin 8.4 g/dL      Hematocrit 27.3 %         MCV 87.2 fL      MCH 26.8 pg      MCHC 30.8 g/dL      RDW 15.8 %      RDW-SD 49.6 fl      MPV 9.5 fL      Platelets 365 10*3/mm3     Vitamin B12 [[id number]]  (Normal) Collected:  08/15/20 0507    Specimen:  Blood Updated:  08/15/20 1214     Vitamin B-12 842 pg/mL     Narrative:       Results may be falsely increased if patient taking Biotin.      Hemoglobin A1c [[id number]]  (Abnormal) Collected:  08/15/20 0507    Specimen:  Blood Updated:  08/15/20 0549     Hemoglobin A1C 5.90 %     Narrative:       Hemoglobin A1C Ranges:    Increased Risk for Diabetes  5.7% to 6.4%  Diabetes                     >= 6.5%  Diabetic Goal                < 7.0%    Comprehensive Metabolic Panel [[id number]]  (Abnormal) Collected:  08/15/20 0507    Specimen:  Blood Updated:  08/15/20 0547     Glucose 111 mg/dL      BUN 21 mg/dL      Creatinine 0.58 mg/dL      Sodium 141 mmol/L      Potassium 3.9 mmol/L      Chloride 102 mmol/L      CO2 28.0 mmol/L      Calcium 9.4 mg/dL      Total Protein 5.9 g/dL      Albumin 3.80 g/dL      ALT (SGPT) 11 U/L      AST (SGOT) 25 U/L      Alkaline Phosphatase 71 U/L      Total Bilirubin 0.2 mg/dL      eGFR Non African Amer 100 mL/min/1.73      Globulin 2.1 gm/dL      A/G Ratio 1.8 g/dL      BUN/Creatinine Ratio 36.2     Anion Gap 11.0 mmol/L     Narrative:       GFR Normal >60  Chronic Kidney Disease <60  Kidney Failure <15      Lipid Panel [[id number]] Collected:  08/15/20 0507    Specimen:  Blood Updated:  08/15/20 0547     Total Cholesterol 163 mg/dL      Triglycerides 84 mg/dL      HDL Cholesterol 46 mg/dL      LDL Cholesterol  100 mg/dL      VLDL Cholesterol 16.8 mg/dL      LDL/HDL Ratio 2.18    Narrative:       Cholesterol Reference Ranges  (U.S. Department of Health and Human Services ATP III Classifications)    Desirable          <200 mg/dL  Borderline High    200-239 mg/dL  High Risk          >240 mg/dL      Triglyceride Reference Ranges  (U.S. Department of Health and Human Services ATP III  Classifications)    Normal           <150 mg/dL  Borderline High  150-199 mg/dL  High             200-499 mg/dL  Very High        >500 mg/dL    HDL Reference Ranges  (U.S. Department of Health and Human Services ATP III Classifcations)    Low     <40 mg/dl (major risk factor for CHD)  High    >60 mg/dl ('negative' risk factor for CHD)        LDL Reference Ranges  (U.S. Department of Health and Human Services ATP III Classifcations)    Optimal          <100 mg/dL  Near Optimal     100-129 mg/dL  Borderline High  130-159 mg/dL  High             160-189 mg/dL  Very High        >189 mg/dL    CBC Auto Differential [[id number]]  (Abnormal) Collected:  08/15/20 0507    Specimen:  Blood Updated:  08/15/20 0524     WBC 9.99 10*3/mm3      RBC 3.03 10*6/mm3      Hemoglobin 8.3 g/dL      Hematocrit 26.4 %      MCV 87.1 fL      MCH 27.4 pg      MCHC 31.4 g/dL      RDW 15.5 %      RDW-SD 49.2 fl      MPV 9.4 fL      Platelets 384 10*3/mm3      Neutrophil % 78.4 %      Lymphocyte % 11.9 %      Monocyte % 6.9 %      Eosinophil % 1.7 %      Basophil % 0.3 %      Immature Grans % 0.8 %      Neutrophils, Absolute 7.83 10*3/mm3      Lymphocytes, Absolute 1.19 10*3/mm3      Monocytes, Absolute 0.69 10*3/mm3      Eosinophils, Absolute 0.17 10*3/mm3      Basophils, Absolute 0.03 10*3/mm3      Immature Grans, Absolute 0.08 10*3/mm3      nRBC 0.0 /100 WBC     Iron Profile [[id number]]  (Abnormal) Collected:  08/14/20 1549    Specimen:  Blood Updated:  08/14/20 2254     Iron 26 mcg/dL      Iron Saturation 8 %      Transferrin 228 mg/dL      TIBC 340 mcg/dL     Ferritin [[id number]]  (Abnormal) Collected:  08/14/20 1549    Specimen:  Blood Updated:  08/14/20 2141     Ferritin 746.80 ng/mL     Narrative:       Results may be falsely decreased if patient taking Biotin.      COVID PRE-OP / PRE-PROCEDURE SCREENING ORDER (NO ISOLATION) - Swab, Nasal Cavity [[id number]] Collected:  08/14/20 1910    Specimen:  Swab from Nasal Cavity Updated:  08/14/20  2105    Narrative:       The following orders were created for panel order COVID PRE-OP / PRE-PROCEDURE SCREENING ORDER (NO ISOLATION) - Swab, Nasal Cavity.  Procedure                               Abnormality         Status                     ---------                               -----------         ------                     COVID-19,[facility]...[[id number]]  Normal              Final result                 Please view results for these tests on the individual orders.    COVID-19,[facility] IN-HOUSE,Nasal Swab No Transport Media 3-4 HR TAT - Swab, Nasal Cavity [[id number]]  (Normal) Collected:  08/14/20 1910    Specimen:  Swab from Nasal Cavity Updated:  08/14/20 2105     COVID19 Not Detected    Narrative:       Fact sheet for providers: https://www.fda.gov/media/615621/download     Fact sheet for patients: https://www.fda.gov/media/908549/download    COVID PRE-OP / PRE-PROCEDURE SCREENING ORDER (NO ISOLATION) - Swab, Nasopharynx [[id number]] Collected:  08/14/20 1810    Specimen:  Swab from Nasopharynx Updated:  08/14/20 1906    Narrative:       The following orders were created for panel order COVID PRE-OP / PRE-PROCEDURE SCREENING ORDER (NO ISOLATION) - Swab, Nasopharynx.  Procedure                               Abnormality         Status                     ---------                               -----------         ------                     COVID-19, [facility] IN-HOUS...[[id number]]  Normal              Final result                 Please view results for these tests on the individual orders.    COVID-19, [facility] IN-HOUSE,NP Swab (NO TRANSPORT MEDIA) 2 HR TAT - Swab, Nasopharynx [[id number]]  (Normal) Collected:  08/14/20 1810    Specimen:  Swab from Nasopharynx Updated:  08/14/20 1906     COVID19 Invalid    Narrative:       Unable to obtain valid results due to interfering substances. Fact sheet for providers: https://www.fda.gov/media/234646/download     Fact sheet for patients:  https://www.fda.gov/media/904984/download    Urinalysis, Microscopic Only - Urine, Clean Catch [[id number]]  (Abnormal) Collected:  08/14/20 1648    Specimen:  Urine, Clean Catch Updated:  08/14/20 1700     RBC, UA 6-12 /HPF      WBC, UA 0-2 /HPF      Bacteria, UA None Seen /HPF      Squamous Epithelial Cells, UA 0-2 /HPF      Hyaline Casts, UA 0-2 /LPF      Methodology Automated Microscopy    Urinalysis With Culture If Indicated - Urine, Clean Catch [[id number]]  (Abnormal) Collected:  08/14/20 1648    Specimen:  Urine, Clean Catch Updated:  08/14/20 1659     Color, UA Yellow     Appearance, UA Clear     pH, UA <=5.0     Specific Gravity, UA 1.020     Glucose, UA Negative     Ketones, UA Negative     Bilirubin, UA Negative     Blood, UA Trace     Protein, UA Negative     Leuk Esterase, UA Negative     Nitrite, UA Negative     Urobilinogen, UA 1.0 E.U./dL    Comprehensive Metabolic Panel [[id number]]  (Abnormal) Collected:  08/14/20 1549    Specimen:  Blood Updated:  08/14/20 1615     Glucose 135 mg/dL      BUN 23 mg/dL      Creatinine 0.65 mg/dL      Sodium 140 mmol/L      Potassium 4.3 mmol/L      Chloride 98 mmol/L      CO2 29.0 mmol/L      Calcium 9.3 mg/dL      Total Protein 6.3 g/dL      Albumin 3.90 g/dL      ALT (SGPT) 9 U/L      AST (SGOT) 12 U/L      Alkaline Phosphatase 77 U/L      Total Bilirubin 0.2 mg/dL      eGFR Non African Amer 88 mL/min/1.73      eGFR  African Amer 106 mL/min/1.73      Globulin 2.4 gm/dL      A/G Ratio 1.6 g/dL      BUN/Creatinine Ratio 35.4     Anion Gap 13.0 mmol/L     Narrative:       GFR Normal >60  Chronic Kidney Disease <60  Kidney Failure <15      CBC & Differential [[id number]] Collected:  08/14/20 1549    Specimen:  Blood Updated:  08/14/20 0379    Narrative:       The following orders were created for panel order CBC & Differential.  Procedure                               Abnormality         Status                     ---------                               -----------          ------                     CBC Auto Differential[[id number]]        Abnormal            Final result                 Please view results for these tests on the individual orders.    CBC Auto Differential [[id number]]  (Abnormal) Collected:  08/14/20 1549    Specimen:  Blood Updated:  08/14/20 1559     WBC 9.02 10*3/mm3      RBC 3.25 10*6/mm3      Hemoglobin 8.8 g/dL      Hematocrit 28.2 %      MCV 86.8 fL      MCH 27.1 pg      MCHC 31.2 g/dL      RDW 15.7 %      RDW-SD 49.3 fl      MPV 9.4 fL      Platelets 413 10*3/mm3      Neutrophil % 78.8 %      Lymphocyte % 11.0 %      Monocyte % 7.3 %      Eosinophil % 1.9 %      Basophil % 0.4 %      Immature Grans % 0.6 %      Neutrophils, Absolute 7.11 10*3/mm3      Lymphocytes, Absolute 0.99 10*3/mm3      Monocytes, Absolute 0.66 10*3/mm3      Eosinophils, Absolute 0.17 10*3/mm3      Basophils, Absolute 0.04 10*3/mm3      Immature Grans, Absolute 0.05 10*3/mm3      nRBC 0.0 /100 WBC         Culture Data:   No results found for: BLOODCX, URINECX, WOUNDCX, MRSACX, RESPCX, STOOLCX    Hospital Course  Patient is a 80 y.o. female presented to [facility] emergency room 8/14/20 with complaints of neck pain, back pain right ankle pain after fall the evening prior.  Patient lives alone and has history of osteoarthritis and takes methotrexate, hypertension, hypothyroidism and chronic back pain secondary to osteoarthritis.  Patient reported falling the evening before and had acute on chronic neck back and ankle pain.  She reported arthritis has worsened over the last 3 months and she has had recurrent falls at home over the past few weeks.  She had a fall the evening before admission and hit her head but did not lose consciousness.  CT scan lumbar spine noted degenerative spondylosis with degenerative disc changes.  CT thoracic spine no acute fracture or subluxation.  Moderate multilevel degenerative changes of the thoracic spine.  CT cervical spine no acute fracture.   Anterolisthesis C3 on C4 and C4 on C5 likely related degenerative changes.  Severe multilevel cervical spine degenerative changes.  CT scan of the head no acute intracranial findings.  Global cerebral volume loss and presumed chronic microvascular ischemic white matter changes.  Degree of ventricular dilatation questionable disproportionate to the background of underlying volume loss raising possibility of normal pressure hydrocephalus.  Hemoglobin 8.8 with normal MCV.  Patient denies shortness of breath.  Norco given in the emergency room.    She was admitted to the medical floor with ataxia and recurrent falls.  SCDs placed for deep vein thrombosis prophylaxis.  Home medications reviewed and resumed if appropriate.  Physical therapy and Occupational Therapy consulted.  MRI the brain 8/15 noted no acute intracranial findings.  No acute infarct.  Chronic microvascular ischemic white matter changes and global cerebral volume loss.  Presumed ex vacuo intraocular dilatation with stable ventricular caliber compared to 1 day prior.  Dr. Patten reviewed radiology imaging and no need for neurology consult.    Physical therapy consulted patient was able to ambulate but was noted to have leaning after a few steps.  Required cues for balance before attempting ambulation.  Patient was able to ambulate 20 feet with rolling walker and contact-guard with multiple cues for safety.  Some ataxia and increased tone.  Recommendations for physical therapy and skilled nursing facility at discharge.    History of hypertension Home blood pressure medications resumed.  History of arthritis takes methotrexate 7.5 mg weekly on Tuesdays.  Resume.    Social service consulted for discharge planning as family requested skilled nursing facility.  Referral to [facility] and bed offered on 8/17/2020.    On 8/17/2020 patient is stable for discharge to [facility] for ongoing physical therapy and Occupational Therapy prior to discharge home.  Home  "medications reviewed have been resumed.  Patient denies nausea, vomiting or abdominal pain.  Some balance issues when ambulating and requires verbal cues for physical therapy.  No oxygen required.  Follow-up with Dr. Randall Taveras after discharged from [facility].    Physical Exam on Discharge:  /61 (BP Location: Left arm, Patient Position: Lying)   Pulse 91   Temp 97.7 °F (36.5 °C) (Oral)   Resp 16   Ht 152.4 cm (60\")   Wt 59.6 kg (131 lb 8 oz)   SpO2 96%   BMI 25.68 kg/m²   Physical Exam  Constitutional: She is oriented to person, place, and time.   No distress.  Lying in bed.  No oxygen use.  Daughter in room  HENT:   Head: Normocephalic and atraumatic.   Eyes: Pupils are equal, round, and reactive to light. EOM are normal.   Neck: Normal range of motion. Neck supple.   Cardiovascular: Normal rate and regular rhythm. Exam reveals no gallop and no friction rub.   Murmur heard.  Normal sinus rhythm  on telemetry   Pulmonary/Chest: Effort normal and breath sounds normal. She has no wheezes. She has no rales.   No oxygen in use.  Lungs clear.   Abdominal: Soft. Bowel sounds are normal. She exhibits no distension. There is no tenderness.   Genitourinary:   Genitourinary Comments: Voiding.   Musculoskeletal: Normal range of motion. She exhibits no edema, tenderness or deformity.   Neurological: She is alert and oriented to person, place, and time.   Follows commands.  No word finding issues.  Denies blurred vision double vision.   Skin: Skin is warm and dry. No rash noted. No erythema. No pallor.   Psychiatric: She has a normal mood and affect. Her behavior is normal. Judgment and thought content normal.    Condition on Discharge: Stable    Discharge Disposition: [facility]    Discharge Diet:   Diet Instructions     Advance Diet As Tolerated        As tolerated    Activity at Discharge:   Activity Instructions     Activity as Tolerated        As tolerated    Discharge Care Plan / Instructions:   1. "  For worsening difficulty walking and balance issues seek medical attention.  2.  Discharge to [facility].  3.  Follow-up with Dr. Randall Taveras after discharged from [facility]    Discharge Medications:     Discharge Medications      New Medications      Instructions Start Date   acetaminophen 325 MG tablet  Commonly known as:  TYLENOL   650 mg, Oral, Every 4 Hours PRN      sennosides-docusate 8.6-50 MG per tablet  Commonly known as:  PERICOLACE   1 tablet, Oral, Nightly         Changes to Medications      Instructions Start Date   HYDROcodone-acetaminophen  MG per tablet  Commonly known as:  NORCO  What changed:    · when to take this  · reasons to take this   1 tablet, Oral, Every 6 Hours PRN      labetalol 100 MG tablet  Commonly known as:  NORMODYNE  What changed:  when to take this   100 mg, Oral, Daily   Start Date:  August 18, 2020        Continue These Medications      Instructions Start Date   DULoxetine 60 MG capsule  Commonly known as:  CYMBALTA   60 mg, Oral, Daily      folic acid 1 MG tablet  Commonly known as:  FOLVITE   1 mg, Oral, Daily      hydroCHLOROthiazide 12.5 MG tablet  Commonly known as:  HYDRODIURIL   12.5 mg, Oral, Daily      levothyroxine 88 MCG tablet  Commonly known as:  SYNTHROID, LEVOTHROID   88 mcg, Oral, Daily      losartan 50 MG tablet  Commonly known as:  COZAAR   50 mg, Oral, Daily      methotrexate 2.5 MG tablet   7.5 mg, Oral, Weekly, Tuesday.           Follow-up Appointments:   Follow-up Information     Hardik Taveras, DO Follow up.    Specialty:  Family Medicine  Why:  After discharged from [facility]  Contact information:  [address] [place] [id number] [phone]                 Future Appointments:  No future appointments.    Test Results Pending at Discharge: None    The above documentation resulted from a face-to-face encounter by me Mervat HERNÁNDEZ, [facility].    Electronically signed by BETTY Bruce, 8/17/2020,  10:06.    Time: This discharge process required 35 minutes for completion.    Plan discussed with Dr. Green, patient, and daughter present in room at time of assessment.    Time spent in face-to-face evaluation, chart review, planning and education 35 minutes.

## 2020-08-17 NOTE — PROGRESS NOTES
Malnutrition Severity Assessment    Patient Name:  Betty Dennis  YOB: 1940  MRN: [id number]  Admit Date:  8/14/2020    Patient meets criteria for : Moderate (non-severe) Malnutrition(Secondary Sign: Generalized Weakness)    Comments:  If in agreement with assessment please attest and add to diagnoses list.Thank you    Malnutrition Severity Assessment  Malnutrition Type: Chronic Disease - Related Malnutrition     Malnutrition Type (last 8 hours)      Malnutrition Severity Assessment     Row Name 08/17/20 1459       Malnutrition Severity Assessment    Malnutrition Type  Chronic Disease - Related Malnutrition    Row Name 08/17/20 1459       Insufficient Energy Intake     Insufficient Energy Intake Findings  Severe    Insufficient Energy Intake   <75% of est. energy requirement for > or equal to 1 month    Row Name 08/17/20 1459       Muscle Loss    Loss of Muscle Mass Findings  Moderate    Pent[employer]ostalism Region  Moderate - slight depression    Acromion Bone Region  Moderate - acromion may slightly protrude    Scapular Bone Region  Moderate - mild depression, bones may show slightly    Dorsal Hand Region  Severe - prominent depression    Anterior Thigh Region  Moderate - mild depression on inner thigh    Posterior Calf Region  Moderate - some roundness, slight firmness    Row Name 08/17/20 1459       Fat Loss    Subcutaneous Fat Loss Findings  Moderate    Orbital Region   Moderate -  somewhat hollowness, slightly dark circles    Upper Arm Region  Moderate - some fat tissue, not ample    Row Name 08/17/20 1459       Criteria Met (Must meet criteria for severity in at least 2 of these categories: M Wasting, Fat Loss, Fluid, Secondary Signs, Wt. Status, Intake)    Patient meets criteria for   Moderate (non-severe) Malnutrition Secondary Sign: Generalized Weakness        Electronically signed by:  Yolette Wang MS,RDN,LD  08/17/20 15:11

## 2020-08-18 NOTE — THERAPY DISCHARGE NOTE
Acute Care - Physical Therapy Discharge Summary  [facility]       Patient Name: Betty Dennis  : 1940  MRN: [id number]    Today's Date: 2020  Onset of Illness/Injury or Date of Surgery: 20       Referring Physician: Dr. Patten      Admit Date: 2020      PT Recommendation and Plan    Visit Dx:    ICD-10-CM ICD-9-CM   1. Ataxia R27.0 781.3   2. Abnormal CT of the head R93.0 793.0   3. Anemia, unspecified type D64.9 285.9   4. Impaired cognition R41.89 294.9   5. Decreased activities of daily living (ADL) Z78.9 V49.89   6. Impaired mobility Z74.09 799.89   7. Chronic pain syndrome, osteoarthritis G89.4 338.4       Outcome Measures     Row Name 20 1053 20 1000 20 1404       How much help from another person do you currently need...    Turning from your back to your side while in flat bed without using bedrails?  --  2  -NW  --    Moving from lying on back to sitting on the side of a flat bed without bedrails?  --  2  -NW  --    Moving to and from a bed to a chair (including a wheelchair)?  --  3  -NW  --    Standing up from a chair using your arms (e.g., wheelchair, bedside chair)?  --  3  -NW  --    Climbing 3-5 steps with a railing?  --  2  -NW  --    To walk in hospital room?  --  3  -NW  --    AM-PAC 6 Clicks Score (PT)  --  15  -NW  --       How much help from another is currently needed...    Putting on and taking off regular lower body clothing?  2  -CH  --  1  -MT    Bathing (including washing, rinsing, and drying)  3  -CH  --  2  -MT    Toileting (which includes using toilet bed pan or urinal)  3  -CH  --  2  -MT    Putting on and taking off regular upper body clothing  3  -CH  --  3  -MT    Taking care of personal grooming (such as brushing teeth)  4  -CH  --  4  -MT    Eating meals  4  -CH  --  4  -MT    AM-PAC 6 Clicks Score (OT)  19  -CH  --  16  -MT       Functional Assessment    Outcome Measure Options  --  AM-PAC 6 Clicks Basic Mobility (PT)  -NW  --    Row Name  08/16/20 1100 08/15/20 1500          How much help from another person do you currently need...    Turning from your back to your side while in flat bed without using bedrails?  2  -NW  --     Moving from lying on back to sitting on the side of a flat bed without bedrails?  2  -NW  --     Moving to and from a bed to a chair (including a wheelchair)?  3  -NW  --     Standing up from a chair using your arms (e.g., wheelchair, bedside chair)?  3  -NW  --     Climbing 3-5 steps with a railing?  1  -NW  --     To walk in hospital room?  3  -NW  --     AM-PAC 6 Clicks Score (PT)  14  -NW  --        How much help from another is currently needed...    Putting on and taking off regular lower body clothing?  --  1  -JJ     Bathing (including washing, rinsing, and drying)  --  2  -JJ     Toileting (which includes using toilet bed pan or urinal)  --  2  -JJ     Putting on and taking off regular upper body clothing  --  3  -JJ     Taking care of personal grooming (such as brushing teeth)  --  4  -JJ     Eating meals  --  4  -JJ     AM-PAC 6 Clicks Score (OT)  --  16  -JJ        Functional Assessment    Outcome Measure Options  AM-PAC 6 Clicks Basic Mobility (PT)  -NW  AM-PAC 6 Clicks Daily Activity (OT)  -       User Key  (r) = Recorded By, (t) = Taken By, (c) = Cosigned By    Initials Name Provider Type    Luma Franco, OTR/L Occupational Therapist    NW Bhavna Barba, LAINE Physical Therapy Assistant    Kadi Virk COTA/L Occupational Therapy Assistant    Brittny Beltran, OTR/L Occupational Therapist              Rehab Goal Summary     Row Name 08/18/20 1010             Bed Mobility Goal 1 (PT)    Activity/Assistive Device (Bed Mobility Goal 1, PT)  sit to supine/supine to sit  -AB      [place] Level/Cues Needed (Bed Mobility Goal 1, PT)  minimum assist (75% or more patient effort)  -AB      Time Frame (Bed Mobility Goal 1, PT)  long term goal (LTG);10 days  -AB      Progress/Outcomes (Bed Mobility  Goal 1, PT)  goal not met  -AB         Transfer Goal 1 (PT)    Activity/Assistive Device (Transfer Goal 1, PT)  sit-to-stand/stand-to-sit;bed-to-chair/chair-to-bed;walker, rolling  -AB      [place] Level/Cues Needed (Transfer Goal 1, PT)  contact guard assist  -AB      Time Frame (Transfer Goal 1, PT)  long term goal (LTG);10 days  -AB      Progress/Outcome (Transfer Goal 1, PT)  goal not met  -AB         Gait Training Goal 1 (PT)    Activity/Assistive Device (Gait Training Goal 1, PT)  gait (walking locomotion);assistive device use;decrease fall risk;improve balance and speed;increase endurance/gait distance No sway or LOB  -AB      [place] Level (Gait Training Goal 1, PT)  contact guard assist  -AB      Distance (Gait Goal 1, PT)  50  -AB      Time Frame (Gait Training Goal 1, PT)  long term goal (LTG);10 days  -AB      Progress/Outcome (Gait Training Goal 1, PT)  goal not met  -AB        User Key  (r) = Recorded By, (t) = Taken By, (c) = Cosigned By    Initials Name Provider Type Discipline    Adrienne Gross PTA Physical Therapy Assistant PT              PT Discharge Summary  Anticipated Discharge Disposition (PT): skilled nursing facility  Reason for Discharge: Discharge from facility  Outcomes Achieved: Refer to plan of care for updates on goals achieved  Discharge Destination: SNF      Adrienne Muller PTA   8/18/2020

## 2020-08-20 NOTE — THERAPY DISCHARGE NOTE
"Acute Care - Speech Language Pathology Discharge Summary  [facility]       Patient Name: Betty Dennis  : 1940  MRN: [id number]    Today's Date: 2020  Onset of Illness/Injury or Date of Surgery: 20       Referring Physician: Dr. Patten        Admit Date: 2020      SLP Recommendation and Plan    Visit Dx:    ICD-10-CM ICD-9-CM   1. Ataxia R27.0 781.3   2. Abnormal CT of the head R93.0 793.0   3. Anemia, unspecified type D64.9 285.9   4. Impaired cognition R41.89 294.9   5. Decreased activities of daily living (ADL) Z78.9 V49.89   6. Impaired mobility Z74.09 799.89   7. Chronic pain syndrome, osteoarthritis G89.4 338.4               SLP GOALS     Row Name 20 1500             Orientation Goal 1 (SLP)    Improve Orientation Through Goal 1 (SLP)  demonstrating orientation to place;demonstrating orientation to disease/impairment;use environmental aids to assist with orientation;independently (over 90% accuracy)  -MB      Time Frame (Orientation Goal 1, SLP)  by discharge  -MB      Barriers (Orientation Goal 1, SLP)  none   -MB      Progress/Outcomes (Orientation Goal 1, SLP)  goal not met  -MB         Functional Problem Solving Skills Goal 1 (SLP)    Improve Problem Solving Through Goal 1 (SLP)  answer \"what if\" questions;complete organization/home management task;determine solutions to complex problems;determine multiple solutions to problems;independently (over 90% accuracy)  -MB      Time Frame (Problem Solving Goal 1, SLP)  by discharge  -MB      Barriers (Problem Solving Goal 1, SLP)  none   -MB      Progress/Outcomes (Problem Solving Goal 1, SLP)  goal not met  -MB        User Key  (r) = Recorded By, (t) = Taken By, (c) = Cosigned By    Initials Name Provider Type    Sandhya Ramirez CCC-SLP Speech and Language Pathologist                  SLP Discharge Summary  Anticipated Dischage Disposition (SLP): unknown  Reason for Discharge: discharge from this facility  Progress Toward " Achieving Short/long Term Goals: goals not met within established timelines  Discharge Destination: [facility]      Sandhya Portillo CCC-SLP  8/20/2020

## 2020-09-11 PROBLEM — E44.0 MODERATE MALNUTRITION (HCC): Status: ACTIVE | Noted: 2020-09-11
